# Patient Record
Sex: MALE | Race: WHITE
[De-identification: names, ages, dates, MRNs, and addresses within clinical notes are randomized per-mention and may not be internally consistent; named-entity substitution may affect disease eponyms.]

---

## 2019-07-26 ENCOUNTER — HOSPITAL ENCOUNTER (INPATIENT)
Dept: HOSPITAL 53 - M ED | Age: 84
LOS: 11 days | Discharge: HOSPICE | DRG: 330 | End: 2019-08-06
Admitting: INTERNAL MEDICINE
Payer: MEDICARE

## 2019-07-26 VITALS — DIASTOLIC BLOOD PRESSURE: 55 MMHG | SYSTOLIC BLOOD PRESSURE: 111 MMHG

## 2019-07-26 VITALS — DIASTOLIC BLOOD PRESSURE: 40 MMHG | SYSTOLIC BLOOD PRESSURE: 90 MMHG

## 2019-07-26 VITALS — HEIGHT: 69 IN | BODY MASS INDEX: 30.24 KG/M2 | WEIGHT: 204.15 LBS

## 2019-07-26 DIAGNOSIS — N40.0: ICD-10-CM

## 2019-07-26 DIAGNOSIS — Z79.82: ICD-10-CM

## 2019-07-26 DIAGNOSIS — I10: ICD-10-CM

## 2019-07-26 DIAGNOSIS — E78.5: ICD-10-CM

## 2019-07-26 DIAGNOSIS — E66.9: ICD-10-CM

## 2019-07-26 DIAGNOSIS — Z86.010: ICD-10-CM

## 2019-07-26 DIAGNOSIS — E87.6: ICD-10-CM

## 2019-07-26 DIAGNOSIS — D50.9: ICD-10-CM

## 2019-07-26 DIAGNOSIS — K59.00: ICD-10-CM

## 2019-07-26 DIAGNOSIS — C25.2: ICD-10-CM

## 2019-07-26 DIAGNOSIS — C78.6: Primary | ICD-10-CM

## 2019-07-26 DIAGNOSIS — Z79.899: ICD-10-CM

## 2019-07-26 LAB
ALBUMIN SERPL BCG-MCNC: 2.9 GM/DL (ref 3.2–5.2)
ALT SERPL W P-5'-P-CCNC: 25 U/L (ref 12–78)
BASOPHILS # BLD AUTO: 0 10^3/UL (ref 0–0.2)
BASOPHILS NFR BLD AUTO: 0.3 % (ref 0–1)
BILIRUB CONJ SERPL-MCNC: 0.3 MG/DL (ref 0–0.2)
BILIRUB SERPL-MCNC: 1.1 MG/DL (ref 0.2–1)
BUN SERPL-MCNC: 21 MG/DL (ref 7–18)
CALCIUM SERPL-MCNC: 9.3 MG/DL (ref 8.8–10.2)
CHLORIDE SERPL-SCNC: 104 MEQ/L (ref 98–107)
CO2 SERPL-SCNC: 28 MEQ/L (ref 21–32)
CREAT SERPL-MCNC: 1.01 MG/DL (ref 0.7–1.3)
EOSINOPHIL # BLD AUTO: 0 10^3/UL (ref 0–0.5)
EOSINOPHIL NFR BLD AUTO: 0.1 % (ref 0–3)
GFR SERPL CREATININE-BSD FRML MDRD: > 60 ML/MIN/{1.73_M2} (ref 35–?)
GLUCOSE SERPL-MCNC: 143 MG/DL (ref 70–100)
HCT VFR BLD AUTO: 40.1 % (ref 42–52)
HGB BLD-MCNC: 13.3 G/DL (ref 13.5–17.5)
LIPASE SERPL-CCNC: 61 U/L (ref 73–393)
LYMPHOCYTES # BLD AUTO: 0.8 10^3/UL (ref 1.5–4.5)
LYMPHOCYTES NFR BLD AUTO: 5.1 % (ref 24–44)
MCH RBC QN AUTO: 32.8 PG (ref 27–33)
MCHC RBC AUTO-ENTMCNC: 33.2 G/DL (ref 32–36.5)
MCV RBC AUTO: 98.8 FL (ref 80–96)
MONOCYTES # BLD AUTO: 0.6 10^3/UL (ref 0–0.8)
MONOCYTES NFR BLD AUTO: 3.8 % (ref 0–5)
NEUTROPHILS # BLD AUTO: 13.9 10^3/UL (ref 1.8–7.7)
NEUTROPHILS NFR BLD AUTO: 90 % (ref 36–66)
PLATELET # BLD AUTO: 255 10^3/UL (ref 150–450)
POTASSIUM SERPL-SCNC: 3.8 MEQ/L (ref 3.5–5.1)
PROT SERPL-MCNC: 7.1 GM/DL (ref 6.4–8.2)
RBC # BLD AUTO: 4.06 10^6/UL (ref 4.3–6.1)
SODIUM SERPL-SCNC: 141 MEQ/L (ref 136–145)
WBC # BLD AUTO: 15.4 10^3/UL (ref 4–10)

## 2019-07-26 RX ADMIN — ONDANSETRON ONE MG: 2 INJECTION INTRAMUSCULAR; INTRAVENOUS at 10:38

## 2019-07-26 RX ADMIN — HYDRALAZINE HYDROCHLORIDE SCH MG: 20 INJECTION INTRAMUSCULAR; INTRAVENOUS at 21:00

## 2019-07-26 RX ADMIN — DIATRIZOATE MEGLUMINE AND DIATRIZOATE SODIUM SCH ML: 600; 100 SOLUTION ORAL; RECTAL at 10:37

## 2019-07-26 RX ADMIN — ONDANSETRON ONE MG: 2 INJECTION INTRAMUSCULAR; INTRAVENOUS at 11:48

## 2019-07-26 RX ADMIN — DIATRIZOATE MEGLUMINE AND DIATRIZOATE SODIUM SCH ML: 600; 100 SOLUTION ORAL; RECTAL at 11:02

## 2019-07-26 RX ADMIN — ENOXAPARIN SODIUM SCH MG: 40 INJECTION SUBCUTANEOUS at 17:30

## 2019-07-26 RX ADMIN — DEXTROSE AND SODIUM CHLORIDE SCH MLS/HR: 5; 450 INJECTION, SOLUTION INTRAVENOUS at 15:57

## 2019-07-26 NOTE — MEDONC
NEW PATIENT CONSULTATION:

 

DATE OF SERVICE: 07/26/2019

 

REASON FOR CONSULTATION: Pancreatic mass.

 

This is a very pleasant 85-year-old gentleman who had presented to the emergency

room this morning due to a chief complaint of abdominal distension, persistent

nausea, vomiting and abdominal pain.  This had been going on for at least 24-48

hours.  When the patient arrived to the emergency room he was markedly distended

and an NG tube was placed for drainage.  The patient's abdomen was not acute on

palpation.  However, he had gone for imaging studies of the abdomen to rule out

acute abdominal obstruction.  The CT scan of the abdomen had shown multiple

calcified granulomas in the liver, prior cholecystectomy, prominence of the

common bile duct and multiple calcified granulomas in the spleen which were

normal in size.  There was a suspicious mass in the tail of the pancreas which

appeared to be partially cystic and solid measuring 3 x 9 x 3 cm.  There was no

evidence of any hydronephrosis.  There is an abdominal aortic aneurysm measuring

3.1 cm in maximum dimension.  There were also multiple subcentimeter lymph nodes

seen in the periaortic region and an ill-defined soft tissue in the omentum

compatible with neoplastic metastatic disease.  There was ill-defined

infiltration seen in the more inferior omentum and mesentery to mild extent.

There was a focal area of moderate mesenteric stranding and likely neoplastic

infiltration in the upper pelvis just right to the midline and the small bowel

loops appear to be involved and appear to be partially obstructed with moderate

proximal small bowel dilatation.  There was no evidence of any colonic thickening

or free air.  There is mild diffuse abdominal and pelvic ascites.  The patient

currently is comfortable in the ICU.  He is surrounded by his family and states

that he feels better now that he has the NG tube in.

 

PAST MEDICAL HISTORY:

The patient's past medical history includes benign prostatic hypertrophy (BPH),

hypertension, hyperlipidemia.  The patient's left lower leg is in a permanent

cast secondary to nonhealing of his lower leg, possible osteomyelitis. The

patient has a history of hypertension, gout. Colonoscopy in 2013 where the

patient had shown some nonbleeding internal hemorrhoids, diverticulosis, one

medium polyp in the mid transverse colon, which was biopsied and was consistent

with a tubular adenoma.

 

MEDICATIONS: Include:

- allopurinol 300 mg p.o. daily

- aspirin

- dipyridamole 1 capsule p.o. b.i.d.

- atorvastatin calcium 10 mg p.o. q.p.m.

- cyanocobalamin

- vitamin B12 500 mg p.o. daily

- finasteride 5 mg p.o. q.p.m.

- losartan - hydrochlorothiazide 50/12.5 mg one p.o. daily

- MVI 1 capsule p.o. daily

- omega fish oils 1000 mg p.o. daily

- omeprazole 20 mg p.o. daily

- Silodosin 8 mg p.o. q.p.m.

 

SOCIAL HISTORY

The patient is retired from the phone company. He lives with his wife. His

children are supportive.

 

FAMILY HISTORY: Otherwise noncontributory.

 

REVIEW OF SYSTEMS

As noted in HPI.

 

PHYSICAL EXAMINATION:

VITAL SIGNS:  The patient has a temperature of 98.3, pulse of 95, respiratory

rate is 20, BP is 111/57, pulse oximetry is 93, height is 175, weight is 181.9,

BSA is 2.01, BMI is 26.7.

HEENT: Normocephalic, atraumatic.  PERRL.  EOMI.  Sclerae is white, it is

nonicteric.  Oropharynx clear.  NG tube is present in the nares.

Neck: Supple.

Chest:  Decreased breath sounds at the bases.

Cardiovascular:  S1 and S2 appreciated with no murmurs.

Abdomen: Large, globoid, but distended, very tympanic throughout the abdominal

area.  Bowel sounds are absent.  There is no sign of any ascites.

Extremities: His left lower extremity is currently casted in his polyurethane

boot and there is no edema on the right.

 

LABORATORIES:

Shows a WBC count of 15.4, hemoglobin of 13.3, hematocrit of 40.1, RDW of 13,

platelets of 255, neutrophils of 90, lymphocytes are 5.1.

 

Chemistries show a lipase of 61, albumin of 2.9, alkaline phosphatase of 157, AST

25, ALT of 25, BUN 21, creatinine 1.01, sodium of 141, potassium 3.8.

 

ASSESSMENT:

Pancreatic tail mass with current mesentery thickening suggestive of small bowel

obstruction secondary to malignancy.

 

PLAN:

Plan is to get a biopsy of the mass.  The patient has had a history of a prior

tubulous bilious adenoma.  The pancreatic mass could be a metastatic site or a

primary site.  I advised the family that it is unlikely that we will have a

diagnosis this weekend and that we will see the availability of interventional

radiology.  His NG tube will need to remain for several days likely and then

further discussions about treatment etc will be made after tissue diagnosis is

confirmed.

 

 

 

Electronically Signed by

Chinyere Daley MD 07/29/2019 12:32 P

 

 

 

 

 

 

 

 

DD:  Chinyere Daley MD 07/26/2019 04:54 P

DT:  np 07/26/2019 05:50 P

 

CC:

## 2019-07-26 NOTE — HPEPDOC
[facility] Medical History & Physical


Date of Admission


Jul 26, 2019


Date of Service:  Jul 26, 2019


Attending Physician:  GRAHAM ESQUIVEL MD





History and Physical


CHIEF COMPLAINT: Abdominal pain 





HISTORY OF PRESENT ILLNESS: 


Mr. Clark is an 85 year old male resented with complaints of diffuse, 

nonradiating, eating, 9 out of 10 in severity, abdominal pain. Associated 

symptoms included constipation (his last BM was on Tuesday), along with nausea, 

vomiting and abdominal distention. He denied obstipation. He has lost weight but

denies fevers and chills.





PAST MEDICAL/ SURGICAL HISTORY:


1. Chronic hypertension.


2. Right lower extremity surgery.


3 BPH.


4 Dyslipidemia





SOCIAL HISTORY:


Quit smoking in the 60s.


Takes alcohol occasionally.





FAMILY HISTORY:


Denies family history of heart problems or lung problems.





ALLERGIES: Please see below.





REVIEW OF SYSTEMS:


12 point review of systems negative except as listed in HPI





HOME MEDICATIONS: Please see below. 





PHYSICAL EXAMINATION:


Temperature 97.8, heart rate 92, respiratory rate 18, blood pressure 111/55, 

pulse oximetry 93% on room air


GENERAL APPEARANCE:. Well-nourished, well-developed, has flat affect. 


HEENT:. Normocephalic, atraumatic. Mucous members moist and pink. NG tube in 

place to low wall suction. Stomach contents or bilious


CARDIOVASCULAR: Rate and rhythm. No murmurs, rubs or gallops.


LUNGS:. Sensation on room air.


ABDOMEN: Abdominal sounds are hypoactive. The abdomen is slightly distended, 

firm but not tender on palpation.


MUSCULOSKELETAL: Range of motion intact 4 extremities.


EXTREMITIES:, 2+ bilateral lower extremity edema.


NEUROLOGICAL: CN II to 12 grossly intact. Speech not dysarthric


PSYCHIATRIC:. Alert and oriented to person, place and time, able to understand 

and follow all commands. Patient has a flat affect.





LABORATORY DATA: See below.





IMAGING: 


CT of the abdomen showed a mass in the tail of the pancreas that was 3.9 x 3 cm.

There was metastatic disease noted to affect the mesentery and the small bowels 

causing obstruction.





MICROBIOLOGY: Please see below. 





ASSESSMENT: Mr. Clark is an 85-year-old gentleman with a past medical history of 

BPH, hypertension and dyslipidemia will be admitted for management of small bow

el obstruction secondary to metastatic pancreatic cancer.


.


PLAN:


1., Small bowel obstruction secondary to Metastatic pancreatic cancer.


Plan: Admit to medical floor, continue nothing by mouth and NG to suction, 

continue with IV fluids and Zofran, consult oncology and palliative care, 

morphine when necessary for pain control.





2. Chronic hypertension.


Plan: Monitor blood pressure, and hold meds well. Nothing by mouth, hydralazine 

PRN





3. BPH.


Plan hold meds





DVT prophylaxis with Lovenox.





Disposition pending clinical course





Vital Signs





Vital Signs








  Date Time  Temp Pulse Resp B/P (MAP) Pulse Ox O2 Delivery O2 Flow Rate FiO2


 


7/26/19 17:22 97.8 92 18 111/55 (73) 91   


 


7/26/19 14:15      Room Air  











Laboratory Data


Labs 24H


Laboratory Tests 2


7/26/19 09:47: 


Immature Granulocyte % (Auto) 0.7, White Blood Count 15.4H, Red Blood Count 

4.06L, Hemoglobin 13.3L, Hematocrit 40.1L, Mean Corpuscular Volume 98.8H, Mean 

Corpuscular Hemoglobin 32.8, Mean Corpuscular Hemoglobin Concent 33.2, Red Cell 

Distribution Width 13.0, Platelet Count 255, Neutrophils (%) (Auto) 90.0H, 

Lymphocytes (%) (Auto) 5.1L, Monocytes (%) (Auto) 3.8, Eosinophils (%) (Auto) 

0.1, Basophils (%) (Auto) 0.3, Neutrophils # (Auto) 13.9H, Lymphocytes # (Auto) 

0.8L, Monocytes # (Auto) 0.6, Eosinophils # (Auto) 0.0, Basophils # (Auto) 0.0, 

Nucleated Red Blood Cells % (auto) 0.0, Anion Gap 9, Glomerular Filtration Rate 

> 60.0, Calcium Level 9.3, Aspartate Amino Transf (AST/SGOT) 25, Alanine 

Aminotransferase (ALT/SGPT) 25, Alkaline Phosphatase 157H, Total Bilirubin 1.1H,

Direct Bilirubin 0.3H, Total Protein 7.1, Albumin 2.9L, Albumin/Globulin Ratio 

0.69L, Lipase 61L


7/26/19 11:49: 


Urine Color CARSON, Urine Appearance CLOUDYH, Urine pH 6.0, Urine Specific 

Gravity 1.021, Urine Protein NEGATIVE, Urine Glucose (UA) NEGATIVE, Urine 

Ketones NEGATIVE, Urine Blood NEGATIVE, Urine Nitrite NEGATIVE, Urine Bilirubin 

NEGATIVE, Urine Urobilinogen 2.0H, Urine Leukocyte Esterase NEGATIVE, Urine WBC 

(Auto) 1, Urine RBC (Auto) 2, Urine Hyaline Casts (Auto) 1, Urine Bacteria 

(Auto) NEGATIVE, Urine Squamous Epithelial Cells 0, Urine Amorphous Sediment 

SMALLH, Urine Mucus (Auto) SMALL, Urine Sperm (Auto) 


7/26/19 14:20: POC Lactate (Misc Panel) 2.48*H


7/26/19 18:33: Lactic Acid Level 1.7


CBC/BMP


Laboratory Tests


7/26/19 09:47








Red Blood Count 4.06 L, Mean Corpuscular Volume 98.8 H, Mean Corpuscular 

Hemoglobin 32.8, Mean Corpuscular Hemoglobin Concent 33.2, Red Cell Distribution

Width 13.0, Neutrophils (%) (Auto) 90.0 H, Lymphocytes (%) (Auto) 5.1 L, 

Monocytes (%) (Auto) 3.8, Eosinophils (%) (Auto) 0.1, Basophils (%) (Auto) 0.3, 

Neutrophils # (Auto) 13.9 H, Lymphocytes # (Auto) 0.8 L, Monocytes # (Auto) 0.6,

Eosinophils # (Auto) 0.0, Basophils # (Auto) 0.0





Home Medications


Scheduled


Allopurinol (Zyloprim) 300 Mg Tablet, 300 MG PO DAILY


Aspirin/Dipyridamole (Aggrenox 25 mg-200 mg Capsule) 1 Each Cpmp.12hr, 1 CAP PO 

BID


Atorvastatin Calcium (Atorvastatin Calcium) 10 Mg Tablet, 10 MG PO QPM


Main Cit/Mag/D3/Zn//Armand/Bor (Citracal-Vit D + Magnesium Tab) 1 Each Tablet, 1

TAB PO DAILY


Cyanocobalamin (Vitamin B-12) (Vitamin B-12) 500 Mcg Tablet, 500 MCG PO DAILY


Finasteride (Finasteride) 5 Mg Tablet, 5 MG PO QPM


Losartan/Hydrochlorothiazide (Losartan-Hctz 50-12.5 mg Tab) 1 Each Tablet, 1 TAB

PO DAILY


Multivitamin (Multivitamins) 1 Each Capsule, 1 CAP PO DAILY


Omega-3 Fatty Acids/Fish Oil (Fish Oil 1,000 mg Capsule) 1 Each Capsule, 1,000 

MG PO DAILY


Omeprazole Magnesium (Prilosec Otc) 20 Mg Tablet.dr, 20 MG PO DAILY


Silodosin (Silodosin) 8 Mg Capsule, 8 MG PO QPM





Allergies


Coded Allergies:  


     No Known Allergies (Verified , 7/7/03)





A-FIB/CHADSVASC


A-FIB History


Current/History of A-Fib/PAF?:  No


Current PO Anticoag Therapy:  No











GRAHAM ESQUIVEL MD                Jul 26, 2019 20:58

## 2019-07-26 NOTE — REP
CT ABDOMEN AND PELVIS WITH ORAL AND IV CONTRAST:

 

TECHNIQUE:  Axial contrast enhanced images from the lung bases to the pubic

symphysis using 100 mL Isovue 370 intravenous contrast material with multiplanar

reformations.

 

Visualized lung bases demonstrate patchy infiltrates or atelectasis inferiorly

bilaterally.

 

Multiple calcified granulomas are seen in the liver.  No parenchymal mass is

seen.  The patient has had prior cholecystectomy.  There is expected prominence

of the common bile duct and central intrahepatic ducts.  Multiple calcified

granulomas are seen in the spleen, which is normal in size.  The adrenals are

normal.  There is a suspicious mass in the tail of the pancreas, which appears

partially cystic and solid.  It measures approximately 3.9 x 3.0 cm.  A few small

cysts are seen in each kidney.  There is no hydronephrosis bilaterally.  Moderate

atherosclerotic calcification is seen of the abdominal aorta with minimal

aneurysmal dilatation 3.1 cm in maximum AP dimension.  Multiple subcentimeter

lymph nodes are seen in the periaortic region.  Ill-defined soft tissue is seen

in the omentum compatible with neoplastic metastatic disease. Ill-defined

infiltration is seen of the more inferior omentum and mesentery to a mild extent.

There is a focal area of moderate mesenteric stranding and likely neoplastic

infiltration in the upper pelvis just to the right of midline.  A small bowel

loops appears to be involved and appears to be partially obstructed with moderate

proximal small bowel dilatation.   No definite colonic thickening is seen.  There

is sigmoid diverticulosis.  There is mild scattered free fluid throughout the

abdomen and pelvis.  There is no free air.  Urinary bladder is not well-distended

and not well evaluated.  There is a small right inguinal hernia containing mild

fluid.  No bony lesions are seen.

 

IMPRESSION:

 

Patchy bibasilar infiltrates/atelectasis in the visualized lung bases.

Suspicious mass in the tail of the pancreas 3.9 x 3.0 cm.  Abnormal soft tissue,

which is ill-defined, diffusely in the superior omentum compatible with

neoplastic infiltration and metastatic disease. There is also scattered

infiltration of the mesentery, which is more focal in the upper pelvis to the

right of midline.  This affects a small bowel loop at that location and appears

to cause small bowel obstruction.  The more proximal small bowel demonstrates

moderate diffuse dilatation.  There is mild diffuse abdominal and pelvic ascites.

 

 

 

Electronically Signed by

Marshall Callahan MD 07/30/2019 05:32 P

## 2019-07-26 NOTE — HPEPDOC
General


Date of Admission


Jul 26, 2019 at 15:04


Chief Complaint


The patient is a 85-year-old male admitted with a reason for visit of Bowel Obs

truction, Pancreatic Mass.





Home Medications


Scheduled


Allopurinol (Zyloprim) 300 Mg Tablet, 300 MG PO DAILY, (Reported)


Aspirin/Dipyridamole (Aggrenox 25 mg-200 mg Capsule) 1 Each Cpmp.12hr, 1 CAP PO 

BID, (Reported)


Atorvastatin Calcium (Atorvastatin Calcium) 10 Mg Tablet, 10 MG PO QPM, 

(Reported)


Main Cit/Mag/D3/Zn//Armand/Bor (Citracal-Vit D + Magnesium Tab) 1 Each Tablet, 1

TAB PO DAILY, (Reported)


Cyanocobalamin (Vitamin B-12) (Vitamin B-12) 500 Mcg Tablet, 500 MCG PO DAILY, 

(Reported)


Finasteride (Finasteride) 5 Mg Tablet, 5 MG PO QPM, (Reported)


Losartan/Hydrochlorothiazide (Losartan-Hctz 50-12.5 mg Tab) 1 Each Tablet, 1 TAB

PO DAILY, (Reported)


Multivitamin (Multivitamins) 1 Each Capsule, 1 CAP PO DAILY, (Reported)


Omega-3 Fatty Acids/Fish Oil (Fish Oil 1,000 mg Capsule) 1 Each Capsule, 1,000 

MG PO DAILY, (Reported)


Omeprazole Magnesium (Prilosec Otc) 20 Mg Tablet.dr, 20 MG PO DAILY, (Reported)


Silodosin (Silodosin) 8 Mg Capsule, 8 MG PO QPM, (Reported)





Allergies


Coded Allergies:  


     No Known Allergies (Verified , 7/7/03)





Vital Signs





Vital Signs








  Date Time  Temp Pulse Resp B/P (MAP) Pulse Ox O2 Delivery O2 Flow Rate FiO2


 


7/26/19 17:22 97.8 92 18 111/55 (73) 91   


 


7/26/19 14:15      Room Air  











Laboratory Data


Labs 24H


Laboratory Tests 2


7/26/19 09:47: 


Immature Granulocyte % (Auto) 0.7, White Blood Count 15.4H, Red Blood Count 

4.06L, Hemoglobin 13.3L, Hematocrit 40.1L, Mean Corpuscular Volume 98.8H, Mean 

Corpuscular Hemoglobin 32.8, Mean Corpuscular Hemoglobin Concent 33.2, Red Cell 

Distribution Width 13.0, Platelet Count 255, Neutrophils (%) (Auto) 90.0H, 

Lymphocytes (%) (Auto) 5.1L, Monocytes (%) (Auto) 3.8, Eosinophils (%) (Auto) 

0.1, Basophils (%) (Auto) 0.3, Neutrophils # (Auto) 13.9H, Lymphocytes # (Auto) 

0.8L, Monocytes # (Auto) 0.6, Eosinophils # (Auto) 0.0, Basophils # (Auto) 0.0, 

Nucleated Red Blood Cells % (auto) 0.0, Anion Gap 9, Glomerular Filtration Rate 

> 60.0, Calcium Level 9.3, Aspartate Amino Transf (AST/SGOT) 25, Alanine Am

inotransferase (ALT/SGPT) 25, Alkaline Phosphatase 157H, Total Bilirubin 1.1H, 

Direct Bilirubin 0.3H, Total Protein 7.1, Albumin 2.9L, Albumin/Globulin Ratio 

0.69L, Lipase 61L


7/26/19 11:49: 


Urine Color CARSON, Urine Appearance CLOUDYH, Urine pH 6.0, Urine Specific 

Gravity 1.021, Urine Protein NEGATIVE, Urine Glucose (UA) NEGATIVE, Urine 

Ketones NEGATIVE, Urine Blood NEGATIVE, Urine Nitrite NEGATIVE, Urine Bilirubin 

NEGATIVE, Urine Urobilinogen 2.0H, Urine Leukocyte Esterase NEGATIVE, Urine WBC 

(Auto) 1, Urine RBC (Auto) 2, Urine Hyaline Casts (Auto) 1, Urine Bacteria (Aut

o) NEGATIVE, Urine Squamous Epithelial Cells 0, Urine Amorphous Sediment SMALLH,

Urine Mucus (Auto) SMALL, Urine Sperm (Auto) 


7/26/19 14:20: POC Lactate (Misc Panel) 2.48*H


CBC/BMP


Laboratory Tests


7/26/19 09:47








Red Blood Count 4.06 L, Mean Corpuscular Volume 98.8 H, Mean Corpuscular 

Hemoglobin 32.8, Mean Corpuscular Hemoglobin Concent 33.2, Red Cell Distribution

Width 13.0, Neutrophils (%) (Auto) 90.0 H, Lymphocytes (%) (Auto) 5.1 L, 

Monocytes (%) (Auto) 3.8, Eosinophils (%) (Auto) 0.1, Basophils (%) (Auto) 0.3, 

Neutrophils # (Auto) 13.9 H, Lymphocytes # (Auto) 0.8 L, Monocytes # (Auto) 0.6,

Eosinophils # (Auto) 0.0, Basophils # (Auto) 0.0











GRAHAM ESQUIVEL MD                Jul 26, 2019 17:28

## 2019-07-27 VITALS — DIASTOLIC BLOOD PRESSURE: 59 MMHG | SYSTOLIC BLOOD PRESSURE: 117 MMHG

## 2019-07-27 VITALS — SYSTOLIC BLOOD PRESSURE: 132 MMHG | DIASTOLIC BLOOD PRESSURE: 66 MMHG

## 2019-07-27 VITALS — SYSTOLIC BLOOD PRESSURE: 131 MMHG | DIASTOLIC BLOOD PRESSURE: 67 MMHG

## 2019-07-27 VITALS — DIASTOLIC BLOOD PRESSURE: 55 MMHG | SYSTOLIC BLOOD PRESSURE: 108 MMHG

## 2019-07-27 VITALS — SYSTOLIC BLOOD PRESSURE: 104 MMHG | DIASTOLIC BLOOD PRESSURE: 58 MMHG

## 2019-07-27 VITALS — DIASTOLIC BLOOD PRESSURE: 58 MMHG | SYSTOLIC BLOOD PRESSURE: 101 MMHG

## 2019-07-27 VITALS — DIASTOLIC BLOOD PRESSURE: 57 MMHG | SYSTOLIC BLOOD PRESSURE: 106 MMHG

## 2019-07-27 LAB
ALBUMIN SERPL BCG-MCNC: 2.6 GM/DL (ref 3.2–5.2)
ALT SERPL W P-5'-P-CCNC: 18 U/L (ref 12–78)
BASOPHILS # BLD AUTO: 0 10^3/UL (ref 0–0.2)
BASOPHILS NFR BLD AUTO: 0.1 % (ref 0–1)
BILIRUB SERPL-MCNC: 0.9 MG/DL (ref 0.2–1)
BUN SERPL-MCNC: 30 MG/DL (ref 7–18)
BUN SERPL-MCNC: 30 MG/DL (ref 7–18)
CALCIUM SERPL-MCNC: 8.6 MG/DL (ref 8.8–10.2)
CALCIUM SERPL-MCNC: 9 MG/DL (ref 8.8–10.2)
CHLORIDE SERPL-SCNC: 102 MEQ/L (ref 98–107)
CHLORIDE SERPL-SCNC: 103 MEQ/L (ref 98–107)
CO2 SERPL-SCNC: 33 MEQ/L (ref 21–32)
CO2 SERPL-SCNC: 33 MEQ/L (ref 21–32)
CREAT SERPL-MCNC: 0.93 MG/DL (ref 0.7–1.3)
CREAT SERPL-MCNC: 1.01 MG/DL (ref 0.7–1.3)
EOSINOPHIL # BLD AUTO: 0 10^3/UL (ref 0–0.5)
EOSINOPHIL NFR BLD AUTO: 0.2 % (ref 0–3)
GFR SERPL CREATININE-BSD FRML MDRD: > 60 ML/MIN/{1.73_M2} (ref 35–?)
GFR SERPL CREATININE-BSD FRML MDRD: > 60 ML/MIN/{1.73_M2} (ref 35–?)
GLUCOSE SERPL-MCNC: 125 MG/DL (ref 70–100)
GLUCOSE SERPL-MCNC: 127 MG/DL (ref 70–100)
HCT VFR BLD AUTO: 32.4 % (ref 42–52)
HGB BLD-MCNC: 11 G/DL (ref 13.5–17.5)
LYMPHOCYTES # BLD AUTO: 1.4 10^3/UL (ref 1.5–4.5)
LYMPHOCYTES NFR BLD AUTO: 8 % (ref 24–44)
MCH RBC QN AUTO: 32.8 PG (ref 27–33)
MCHC RBC AUTO-ENTMCNC: 34 G/DL (ref 32–36.5)
MCV RBC AUTO: 96.7 FL (ref 80–96)
MONOCYTES # BLD AUTO: 1 10^3/UL (ref 0–0.8)
MONOCYTES NFR BLD AUTO: 5.7 % (ref 0–5)
NEUTROPHILS # BLD AUTO: 14.6 10^3/UL (ref 1.8–7.7)
NEUTROPHILS NFR BLD AUTO: 85.3 % (ref 36–66)
PLATELET # BLD AUTO: 247 10^3/UL (ref 150–450)
POTASSIUM SERPL-SCNC: 3.3 MEQ/L (ref 3.5–5.1)
POTASSIUM SERPL-SCNC: 3.4 MEQ/L (ref 3.5–5.1)
PROT SERPL-MCNC: 6.8 GM/DL (ref 6.4–8.2)
RBC # BLD AUTO: 3.35 10^6/UL (ref 4.3–6.1)
SODIUM SERPL-SCNC: 142 MEQ/L (ref 136–145)
SODIUM SERPL-SCNC: 142 MEQ/L (ref 136–145)
WBC # BLD AUTO: 17.2 10^3/UL (ref 4–10)

## 2019-07-27 RX ADMIN — DEXTROSE AND SODIUM CHLORIDE SCH MLS/HR: 5; 450 INJECTION, SOLUTION INTRAVENOUS at 10:30

## 2019-07-27 RX ADMIN — HYDRALAZINE HYDROCHLORIDE SCH MG: 20 INJECTION INTRAMUSCULAR; INTRAVENOUS at 13:00

## 2019-07-27 RX ADMIN — POTASSIUM CHLORIDE SCH MLS/HR: 7.46 INJECTION, SOLUTION INTRAVENOUS at 14:43

## 2019-07-27 RX ADMIN — HYDRALAZINE HYDROCHLORIDE SCH MG: 20 INJECTION INTRAMUSCULAR; INTRAVENOUS at 19:56

## 2019-07-27 RX ADMIN — ENOXAPARIN SODIUM SCH MG: 40 INJECTION SUBCUTANEOUS at 10:30

## 2019-07-27 RX ADMIN — HYDRALAZINE HYDROCHLORIDE SCH MG: 20 INJECTION INTRAMUSCULAR; INTRAVENOUS at 05:05

## 2019-07-27 RX ADMIN — FINASTERIDE SCH MG: 5 TABLET, FILM COATED ORAL at 20:06

## 2019-07-27 RX ADMIN — POTASSIUM CHLORIDE SCH MLS/HR: 7.46 INJECTION, SOLUTION INTRAVENOUS at 17:00

## 2019-07-27 NOTE — IPNPDOC
Subjective


Date Seen


The patient was seen on 7/27/19.


Time of service 9:50 AM





Subjective


Chief Complaint/HPI


Abdominal pain


Events since last encounter


The patient denies having any abdominal pain, or any other acute complaints this

morning.


Per discussion with his RN  the patient continues to have large amount of 

bilious output through the NG





Objective


Physical Examination


Other physical findings


PHYSICAL EXAMINATION:


GENERAL APPEARANCE:. Well-nourished, well-developed, has flat affect. 


HEENT:. Normocephalic, atraumatic. Mucous members moist and pink. NG tube in p

lace to low wall suction. Stomach contents or bilious


CARDIOVASCULAR: Rate and rhythm. No murmurs, rubs or gallops.


LUNGS:. Sensation on room air.


ABDOMEN: Abdominal sounds are hypoactive the abdomen is less distended than 

yesterday and  not  tender on palpation 


MUSCULOSKELETAL: Range of motion intact 4 extremities.


EXTREMITIES:, 2+ bilateral lower extremity edema.


NEUROLOGICAL: CN II to 12 grossly intact. Speech not dysarthric


PSYCHIATRIC:. Alert and oriented to person, place and time, able to understand 

and follow all commands. Patient has a flat affect.





Assessment /Plan


Assessment


Mr. Clark is an 85-year-old gentleman with a past medical history of BPH, hype

rtension and dyslipidemia will be admitted for management of small bowel 

obstruction secondary to metastatic pancreatic cancer.





PLAN:


1, Small bowel obstruction secondary to Metastatic cancer.


Per Dr. Daley's  consult it's unclear whether the pancreas as the primary or 

sediment. Past desists


Plan appreciate oncology and surgery's recommendations, continue the NG to 

suction, IV fluids, Zofran and morphine 





2. Chronic hypertension.


Plan: Monitor blood pressure, and hold meds well. Nothing by mouth, hydralazine 

PRN





3. BPH.


Plan: hold meds





4. Hypokalemia.


Likely secondary to GI loss


Plan repeat potassium.





5. Obesity.


BMI 30.3





DVT prophylaxis with Lovenox.





Disposition pending clinical course





Plan/VTE


VTE Prophylaxis Ordered?:  Yes (lovenox)





VS, I&O, 24H, Fishbone


Vital Signs/I&O





Vital Signs








  Date Time  Temp Pulse Resp B/P (MAP) Pulse Ox O2 Delivery O2 Flow Rate FiO2


 


7/27/19 13:00    117/59    


 


7/27/19 12:00 97.0 74 18  96   


 


7/26/19 14:15      Room Air  














I&O- Last 24 Hours up to 6 AM 


 


 7/27/19





 06:00


 


Intake Total 500 ml


 


Output Total 675 ml


 


Balance -175 ml











Laboratory Data


24H LABS


Laboratory Tests 2


7/26/19 18:33: Lactic Acid Level 1.7


7/27/19 04:59: 


Anion Gap 7L, Glomerular Filtration Rate > 60.0, Blood Urea Nitrogen 30H, 

Creatinine 1.01, Sodium Level 142, Potassium Level 3.4L, Chloride Level 102, 

Carbon Dioxide Level 33H, Calcium Level 9.0, Aspartate Amino Transf (AST/SGOT) 

16, Alanine Aminotransferase (ALT/SGPT) 18, Alkaline Phosphatase 124H, Total 

Bilirubin 0.9, Total Protein 6.8, Albumin 2.6L, Albumin/Globulin Ratio 0.62L


7/27/19 07:47: Lactic Acid Level 1.8


7/27/19 08:27: 


Anion Gap 6L, Glomerular Filtration Rate > 60.0, Blood Urea Nitrogen 30H, 

Creatinine 0.93, Sodium Level 142, Potassium Level 3.3L, Chloride Level 103, 

Carbon Dioxide Level 33H, Calcium Level 8.6L, Immature Granulocyte % (Auto) 0.7,

White Blood Count 17.2H, Red Blood Count 3.35L, Hemoglobin 11.0#L, Hematocrit 

32.4L, Mean Corpuscular Volume 96.7H, Mean Corpuscular Hemoglobin 32.8, Mean 

Corpuscular Hemoglobin Concent 34.0, Red Cell Distribution Width 12.9, Platelet 

Count 247, Neutrophils (%) (Auto) 85.3H, Lymphocytes (%) (Auto) 8.0L, Monocytes 

(%) (Auto) 5.7H, Eosinophils (%) (Auto) 0.2, Basophils (%) (Auto) 0.1, 

Neutrophils # (Auto) 14.6H, Lymphocytes # (Auto) 1.4L, Monocytes # (Auto) 1.0H, 

Eosinophils # (Auto) 0.0, Basophils # (Auto) 0.0, Nucleated Red Blood Cells % 

(auto) 0.0


CBC/BMP


Laboratory Tests


7/27/19 04:59








Calcium Level 9.0, Aspartate Amino Transf (AST/SGOT) 16, Alanine 

Aminotransferase (ALT/SGPT) 18, Alkaline Phosphatase 124 H, Total Bilirubin 0.9,

Total Protein 6.8, Albumin 2.6 L





7/27/19 08:27








Calcium Level 8.6 L, Red Blood Count 3.35 L, Mean Corpuscular Volume 96.7 H, 

Mean Corpuscular Hemoglobin 32.8, Mean Corpuscular Hemoglobin Concent 34.0, Red 

Cell Distribution Width 12.9, Neutrophils (%) (Auto) 85.3 H, Lymphocytes (%) 

(Auto) 8.0 L, Monocytes (%) (Auto) 5.7 H, Eosinophils (%) (Auto) 0.2, Basophils 

(%) (Auto) 0.1, Neutrophils # (Auto) 14.6 H, Lymphocytes # (Auto) 1.4 L, 

Monocytes # (Auto) 1.0 H, Eosinophils # (Auto) 0.0, Basophils # (Auto) 0.0











GRAHAM ESQUIVEL MD                Jul 27, 2019 16:04

## 2019-07-28 VITALS — DIASTOLIC BLOOD PRESSURE: 64 MMHG | SYSTOLIC BLOOD PRESSURE: 138 MMHG

## 2019-07-28 VITALS — SYSTOLIC BLOOD PRESSURE: 140 MMHG | DIASTOLIC BLOOD PRESSURE: 73 MMHG

## 2019-07-28 VITALS — DIASTOLIC BLOOD PRESSURE: 64 MMHG | SYSTOLIC BLOOD PRESSURE: 136 MMHG

## 2019-07-28 VITALS — DIASTOLIC BLOOD PRESSURE: 67 MMHG | SYSTOLIC BLOOD PRESSURE: 129 MMHG

## 2019-07-28 VITALS — SYSTOLIC BLOOD PRESSURE: 131 MMHG | DIASTOLIC BLOOD PRESSURE: 64 MMHG

## 2019-07-28 LAB
BASOPHILS # BLD AUTO: 0 10^3/UL (ref 0–0.2)
BASOPHILS NFR BLD AUTO: 0.1 % (ref 0–1)
BUN SERPL-MCNC: 24 MG/DL (ref 7–18)
CALCIUM SERPL-MCNC: 8.4 MG/DL (ref 8.8–10.2)
CHLORIDE SERPL-SCNC: 103 MEQ/L (ref 98–107)
CO2 SERPL-SCNC: 31 MEQ/L (ref 21–32)
CREAT SERPL-MCNC: 0.88 MG/DL (ref 0.7–1.3)
EOSINOPHIL # BLD AUTO: 0.1 10^3/UL (ref 0–0.5)
EOSINOPHIL NFR BLD AUTO: 0.6 % (ref 0–3)
GFR SERPL CREATININE-BSD FRML MDRD: > 60 ML/MIN/{1.73_M2} (ref 35–?)
GLUCOSE SERPL-MCNC: 120 MG/DL (ref 70–100)
HCT VFR BLD AUTO: 35.1 % (ref 42–52)
HGB BLD-MCNC: 11.7 G/DL (ref 13.5–17.5)
LYMPHOCYTES # BLD AUTO: 0.9 10^3/UL (ref 1.5–4.5)
LYMPHOCYTES NFR BLD AUTO: 6.6 % (ref 24–44)
MAGNESIUM SERPL-MCNC: 2.3 MG/DL (ref 1.8–2.4)
MCH RBC QN AUTO: 32.6 PG (ref 27–33)
MCHC RBC AUTO-ENTMCNC: 33.3 G/DL (ref 32–36.5)
MCV RBC AUTO: 97.8 FL (ref 80–96)
MONOCYTES # BLD AUTO: 0.7 10^3/UL (ref 0–0.8)
MONOCYTES NFR BLD AUTO: 5.3 % (ref 0–5)
NEUTROPHILS # BLD AUTO: 11.9 10^3/UL (ref 1.8–7.7)
NEUTROPHILS NFR BLD AUTO: 86.7 % (ref 36–66)
PLATELET # BLD AUTO: 263 10^3/UL (ref 150–450)
POTASSIUM SERPL-SCNC: 3.3 MEQ/L (ref 3.5–5.1)
RBC # BLD AUTO: 3.59 10^6/UL (ref 4.3–6.1)
SODIUM SERPL-SCNC: 140 MEQ/L (ref 136–145)
WBC # BLD AUTO: 13.7 10^3/UL (ref 4–10)

## 2019-07-28 RX ADMIN — HYDRALAZINE HYDROCHLORIDE SCH MG: 20 INJECTION INTRAMUSCULAR; INTRAVENOUS at 13:00

## 2019-07-28 RX ADMIN — HYDRALAZINE HYDROCHLORIDE SCH MG: 20 INJECTION INTRAMUSCULAR; INTRAVENOUS at 04:38

## 2019-07-28 RX ADMIN — FINASTERIDE SCH MG: 5 TABLET, FILM COATED ORAL at 21:43

## 2019-07-28 RX ADMIN — HYDRALAZINE HYDROCHLORIDE SCH MG: 20 INJECTION INTRAMUSCULAR; INTRAVENOUS at 21:00

## 2019-07-28 RX ADMIN — DEXTROSE AND SODIUM CHLORIDE SCH MLS/HR: 5; 450 INJECTION, SOLUTION INTRAVENOUS at 09:28

## 2019-07-28 RX ADMIN — ENOXAPARIN SODIUM SCH MG: 40 INJECTION SUBCUTANEOUS at 09:31

## 2019-07-28 NOTE — IPNPDOC
Subjective


Date Seen


The patient was seen on 7/28/19.


Time of service 8:20 AM





Subjective


Chief Complaint/HPI


Abdominal pain.


Events since last encounter


The patient reports feeling much better today, he denies having abdominal pain 

or any acute complaints. His daughter-in-law is at the bedside.


Per discussion with nursing staff. The patient is passing gas and had a bowel 

movement, the output through the NG has decreased this morning but during the 

night shift he had an output of about 900 milliliters.





Objective


Physical Examination


Other physical findings


PHYSICAL EXAMINATION:


VITALS: See below


GENERAL APPEARANCE: Well-nourished, well-developed, appears to be in a better 

mood today


HEENT: Normocephalic, atraumatic. Mucous members moist and pink. NG tube in p

lace to low wall suction. Stomach contents or bilious


CARDIOVASCULAR: Rate and rhythm. No murmurs, rubs or gallops.


LUNGS: Sensation on room air.


ABDOMEN: Bowel sounds are present, but more prominent on the left than on the 

right, the abdomen is no longer distended and is soft and nontender on palpation




MUSCULOSKELETAL: Range of motion intact 4 extremities. He has an ankle brace on

the lower extremity


EXTREMITIES:, 2+ bilateral lower extremity edema.


NEUROLOGICAL: CN II to 12 grossly intact. Speech not dysarthric


PSYCHIATRIC: Alert and oriented to person, place and time, able to understand 

and follow all commands.





Assessment /Plan


Assessment


Mr. Clark is an 85-year-old gentleman with a past medical history of BPH, 

hypertension and dyslipidemia will be admitted for management of small bowel 

obstruction secondary to metastatic cancer (primary source to be determined).





PLAN:


1, Small bowel obstruction secondary to Metastatic cancer.


Primary source to be determined


Plan: appreciate oncology and surgery's recommendations, continue NG to suction 

, IV fluids, Zofran and morphine, IR consult for biopsy, possibly tomorrow





2. Macrocytic anemia.


Hemoglobin has dropped more than 2 points overnight.


Plan: Follow up CBC, reticulocyte count, iron studies, B12, folate and stool 

occult





3 Chronic hypertension.


Plan: Monitor blood pressure, and hold meds well. Nothing by mouth, IV 

hydralazine PRN





4  BPH.


Plan: hold oral meds





5. Hypokalemia.


2/2 GI loss and poor PO intake


Plan: Replete K & Continue to monitor potassium and magnesium





6. Obesity.


BMI 30.3





DVT prophylaxis with Lovenox.





Disposition pending clinical course





Plan/VTE


VTE Prophylaxis Ordered?:  Yes (lovenox)





VS, I&O, 24H, Fishbone


Vital Signs/I&O





Vital Signs








  Date Time  Temp Pulse Resp B/P (MAP) Pulse Ox O2 Delivery O2 Flow Rate FiO2


 


7/28/19 08:00 98.3 76 17 140/73 (95) 94   


 


7/26/19 14:15      Room Air  














I&O- Last 24 Hours up to 6 AM 


 


 7/28/19





 06:00


 


Intake Total 550 ml


 


Output Total 1300 ml


 


Balance -750 ml











Laboratory Data


24H LABS


Laboratory Tests 2


7/27/19 17:10: 











GRAHMA ESQUIVEL MD                Jul 28, 2019 09:57

## 2019-07-28 NOTE — IPNPDOC
Text Note


Date of Service


The patient was seen on 7/28/19.





NOTE


No acute events overnight. He denies any nausea, emesis, fevers, abd pains, or 

distention. He is passing flatus, and had a BM overnight. Nurses report no NGT 

output over the night shift. 





VSSAF





NAD





abd - soft, slightly distended, NT





labs - see below





A) 84y/o male with partial SBO secondary to likely metastatic pancreatic CA that

appears to be resolving





P) clamp NGT


clq diet


ambulate


dc NG after dinner is still tolerating it clamped. 





Buzz Davidson DO





VS,Fishbone, I+O


VS, Fishbone, I+O





Vital Signs








  Date Time  Temp Pulse Resp B/P (MAP) Pulse Ox O2 Delivery O2 Flow Rate FiO2


 


7/28/19 08:00 98.3 76 17 140/73 (95) 94   


 


7/26/19 14:15      Room Air  














I&O- Last 24 Hours up to 6 AM 


 


 7/28/19





 06:00


 


Intake Total 550 ml


 


Output Total 1300 ml


 


Balance -750 ml

















RAO DAVIDSON DO            Jul 28, 2019 09:59

## 2019-07-29 VITALS — DIASTOLIC BLOOD PRESSURE: 62 MMHG | SYSTOLIC BLOOD PRESSURE: 128 MMHG

## 2019-07-29 VITALS — DIASTOLIC BLOOD PRESSURE: 58 MMHG | SYSTOLIC BLOOD PRESSURE: 124 MMHG

## 2019-07-29 VITALS — DIASTOLIC BLOOD PRESSURE: 69 MMHG | SYSTOLIC BLOOD PRESSURE: 131 MMHG

## 2019-07-29 VITALS — SYSTOLIC BLOOD PRESSURE: 137 MMHG | DIASTOLIC BLOOD PRESSURE: 70 MMHG

## 2019-07-29 VITALS — SYSTOLIC BLOOD PRESSURE: 128 MMHG | DIASTOLIC BLOOD PRESSURE: 61 MMHG

## 2019-07-29 VITALS — DIASTOLIC BLOOD PRESSURE: 56 MMHG | SYSTOLIC BLOOD PRESSURE: 115 MMHG

## 2019-07-29 LAB
BASOPHILS # BLD AUTO: 0 10^3/UL (ref 0–0.2)
BASOPHILS NFR BLD AUTO: 0.1 % (ref 0–1)
BUN SERPL-MCNC: 18 MG/DL (ref 7–18)
CALCIUM SERPL-MCNC: 8.5 MG/DL (ref 8.8–10.2)
CHLORIDE SERPL-SCNC: 100 MEQ/L (ref 98–107)
CO2 SERPL-SCNC: 33 MEQ/L (ref 21–32)
CREAT SERPL-MCNC: 0.82 MG/DL (ref 0.7–1.3)
EOSINOPHIL # BLD AUTO: 0.1 10^3/UL (ref 0–0.5)
EOSINOPHIL NFR BLD AUTO: 0.7 % (ref 0–3)
FERRITIN SERPL-MCNC: 157 NG/ML (ref 26–388)
GFR SERPL CREATININE-BSD FRML MDRD: > 60 ML/MIN/{1.73_M2} (ref 35–?)
GLUCOSE SERPL-MCNC: 120 MG/DL (ref 70–100)
HCT VFR BLD AUTO: 34.5 % (ref 42–52)
HGB BLD-MCNC: 11.4 G/DL (ref 13.5–17.5)
IRON SATN MFR SERPL: 10 % (ref 19.7–50)
IRON SERPL-MCNC: 27 UG/DL (ref 65–175)
LYMPHOCYTES # BLD AUTO: 1.1 10^3/UL (ref 1.5–4.5)
LYMPHOCYTES NFR BLD AUTO: 8 % (ref 24–44)
MAGNESIUM SERPL-MCNC: 2.3 MG/DL (ref 1.8–2.4)
MCH RBC QN AUTO: 32.2 PG (ref 27–33)
MCHC RBC AUTO-ENTMCNC: 33 G/DL (ref 32–36.5)
MCV RBC AUTO: 97.5 FL (ref 80–96)
MONOCYTES # BLD AUTO: 0.8 10^3/UL (ref 0–0.8)
MONOCYTES NFR BLD AUTO: 5.8 % (ref 0–5)
NEUTROPHILS # BLD AUTO: 11.4 10^3/UL (ref 1.8–7.7)
NEUTROPHILS NFR BLD AUTO: 84.7 % (ref 36–66)
PLATELET # BLD AUTO: 254 10^3/UL (ref 150–450)
POTASSIUM SERPL-SCNC: 2.9 MEQ/L (ref 3.5–5.1)
RBC # BLD AUTO: 3.54 10^6/UL (ref 4.3–6.1)
SODIUM SERPL-SCNC: 140 MEQ/L (ref 136–145)
TIBC SERPL-MCNC: 270 UG/DL (ref 250–450)
WBC # BLD AUTO: 13.5 10^3/UL (ref 4–10)

## 2019-07-29 RX ADMIN — HYDRALAZINE HYDROCHLORIDE SCH MG: 20 INJECTION INTRAMUSCULAR; INTRAVENOUS at 21:00

## 2019-07-29 RX ADMIN — POTASSIUM CHLORIDE SCH MLS/HR: 7.46 INJECTION, SOLUTION INTRAVENOUS at 10:57

## 2019-07-29 RX ADMIN — HYDRALAZINE HYDROCHLORIDE SCH MG: 20 INJECTION INTRAMUSCULAR; INTRAVENOUS at 05:00

## 2019-07-29 RX ADMIN — DEXTROSE AND SODIUM CHLORIDE SCH MLS/HR: 5; 450 INJECTION, SOLUTION INTRAVENOUS at 07:40

## 2019-07-29 RX ADMIN — ENOXAPARIN SODIUM SCH MG: 40 INJECTION SUBCUTANEOUS at 08:10

## 2019-07-29 RX ADMIN — POTASSIUM CHLORIDE SCH MLS/HR: 7.46 INJECTION, SOLUTION INTRAVENOUS at 14:18

## 2019-07-29 RX ADMIN — SODIUM CHLORIDE, PRESERVATIVE FREE SCH ML: 5 INJECTION INTRAVENOUS at 21:32

## 2019-07-29 RX ADMIN — POTASSIUM CHLORIDE SCH MLS/HR: 7.46 INJECTION, SOLUTION INTRAVENOUS at 15:34

## 2019-07-29 RX ADMIN — HYDRALAZINE HYDROCHLORIDE SCH MG: 20 INJECTION INTRAMUSCULAR; INTRAVENOUS at 12:45

## 2019-07-29 RX ADMIN — POTASSIUM CHLORIDE SCH MLS/HR: 7.46 INJECTION, SOLUTION INTRAVENOUS at 12:30

## 2019-07-29 RX ADMIN — FINASTERIDE SCH MG: 5 TABLET, FILM COATED ORAL at 21:28

## 2019-07-29 RX ADMIN — SODIUM CHLORIDE, PRESERVATIVE FREE SCH ML: 5 INJECTION INTRAVENOUS at 14:00

## 2019-07-29 NOTE — IPNPDOC
Text Note


Date of Service


The patient was seen on 7/29/19.





NOTE


No acute events overnight. He denies any nausea, emesis, fevers, abd pains, or 

distention. He is passing flatus. He did not tolerate the NGT clamped yesterday,

and had to be placed back to suction. 





VSSAF





NAD





abd - soft, slightly distended, NT





labs - see below





A) 84y/o male with partial SBO secondary to likely metastatic pancreatic CA 





P) NGT to LIS


IR for possible bx today


ambulate


if no improvement by the am, then I will plan for OR tomorrow. 





Buzz Davidson DO





VS,Fishbone, I+O


VS, Fishbone, I+O


Laboratory Tests


7/29/19 05:33








Red Blood Count 3.54 L, Mean Corpuscular Volume 97.5 H, Mean Corpuscular 

Hemoglobin 32.2, Mean Corpuscular Hemoglobin Concent 33.0, Red Cell Distribution

Width 12.7, Neutrophils (%) (Auto) 84.7 H, Lymphocytes (%) (Auto) 8.0 L, 

Monocytes (%) (Auto) 5.8 H, Eosinophils (%) (Auto) 0.7, Basophils (%) (Auto) 

0.1, Neutrophils # (Auto) 11.4 H, Lymphocytes # (Auto) 1.1 L, Monocytes # (Auto)

0.8, Eosinophils # (Auto) 0.1, Basophils # (Auto) 0.0, Calcium Level 8.5 L








Vital Signs








  Date Time  Temp Pulse Resp B/P (MAP) Pulse Ox O2 Delivery O2 Flow Rate FiO2


 


7/29/19 08:00 97.8 77 17 128/62 (84) 91   


 


7/26/19 14:15      Room Air  














I&O- Last 24 Hours up to 6 AM 


 


 7/29/19





 06:00


 


Intake Total 632.5 ml


 


Output Total 2930 ml


 


Balance -2297.5 ml

















RAO DAVIDSON DO            Jul 29, 2019 11:07

## 2019-07-29 NOTE — IPNPDOC
Subjective


Date Seen


The patient was seen on 7/29/19.


Time of service 10:20 AM





Subjective


Chief Complaint/HPI


Abdominal pain


Events since last encounter


The patient reports that his abdominal pain has resolved, and has no other acute

complaints





Objective


Physical Examination


Other physical findings


PHYSICAL EXAMINATION:


VITALS: See below


GENERAL APPEARANCE: Well-nourished, well-developed, 


HEENT: Normocephalic, atraumatic. Mucous members moist and pink. NG tube in gino

ce to gravity. Stomach contents or bilious


CARDIOVASCULAR: Rate and rhythm. No murmurs, rubs or gallops.


LUNGS: Sensation on room air.


ABDOMEN: Bowel sounds are present, but more prominent on the left the abdomen is

soft and nontender on palpation 


MUSCULOSKELETAL: Range of motion intact 4 extremities. He has an ankle brace on

the lower extremity


EXTREMITIES:, 2+ bilateral lower extremity edema.


NEUROLOGICAL: CN II to 12 grossly intact. Speech not dysarthric


PSYCHIATRIC: Alert and oriented to person, place and time, able to understand 

and follow all commands





Assessment /Plan


Assessment


Mr. Clark is an 85-year-old gentleman with a past medical history of BPH, hyp

ertension and dyslipidemia will be admitted for management of small bowel 

obstruction secondary to metastatic cancer (primary source to be determined).





PLAN:


1, Small bowel obstruction secondary to Metastatic cancer.


Primary source of cancer be determined


Plan: appreciate oncology and surgery's recommendations, continue NG to suction 

, IV fluids, Zofran and morphine, surgery planned for tomorrow, IR has been 

consulted for for a biopsy,





2. Iron deficiency anemia.


Hemoglobin has dropped more than 2 points overnight.


Plan: Follow up CBC and stool occult, will keep hemoglobin above 7





3 Hypokalemia.


2/2 GI loss and poor PO intake


Plan: Replete K & Continue to monitor potassium and magnesium





4.Chronic hypertension.


Plan: Monitor blood pressure, and hold meds well. Nothing by mouth, IV hydral

azine PRN





5.BPH.


Plan: hold oral meds. 





6. Obesity.


BMI 30.3





DVT. Lovenox on hold





Disposition pending clinical course





Plan/VTE


VTE Prophylaxis Ordered?:  Yes (lovenox)





VS, I&O, 24H, Fishbone


Vital Signs/I&O





Vital Signs








  Date Time  Temp Pulse Resp B/P (MAP) Pulse Ox O2 Delivery O2 Flow Rate FiO2


 


7/29/19 16:00 98.2 86 18 124/58 (80) 92   


 


7/26/19 14:15      Room Air  














I&O- Last 24 Hours up to 6 AM 


 


 7/29/19





 06:00


 


Intake Total 632.5 ml


 


Output Total 2930 ml


 


Balance -2297.5 ml











Laboratory Data


24H LABS


Laboratory Tests 2


7/29/19 05:33: 


Immature Granulocyte % (Auto) 0.7, White Blood Count 13.5H, Red Blood Count 

3.54L, Hemoglobin 11.4L, Hematocrit 34.5L, Mean Corpuscular Volume 97.5H, Mean 

Corpuscular Hemoglobin 32.2, Mean Corpuscular Hemoglobin Concent 33.0, Red Cell 

Distribution Width 12.7, Platelet Count 254, Neutrophils (%) (Auto) 84.7H, 

Lymphocytes (%) (Auto) 8.0L, Monocytes (%) (Auto) 5.8H, Eosinophils (%) (Auto) 

0.7, Basophils (%) (Auto) 0.1, Neutrophils # (Auto) 11.4H, Lymphocytes # (Auto) 

1.1L, Monocytes # (Auto) 0.8, Eosinophils # (Auto) 0.1, Basophils # (Auto) 0.0, 

Reticulocyte # (auto) 52.7, Nucleated Red Blood Cells % (auto) 0.0, Percent 

Reticulocyte Count 1.5, Reticulocyte Hemoglobin Equivalent 37.6H, Anion Gap 7L, 

Glomerular Filtration Rate > 60.0, Blood Urea Nitrogen 18, Creatinine 0.82, 

Sodium Level 140, Potassium Level 2.9*L, Chloride Level 100, Carbon Dioxide 

Level 33H, Calcium Level 8.5L, Magnesium Level 2.3, Iron Level 27L, Total Iron 

Binding Capacity 270, Transferrin % Saturation 10.0L, Ferritin 157


CBC/BMP


Laboratory Tests


7/29/19 05:33








Red Blood Count 3.54 L, Mean Corpuscular Volume 97.5 H, Mean Corpuscular 

Hemoglobin 32.2, Mean Corpuscular Hemoglobin Concent 33.0, Red Cell Distribution

Width 12.7, Neutrophils (%) (Auto) 84.7 H, Lymphocytes (%) (Auto) 8.0 L, 

Monocytes (%) (Auto) 5.8 H, Eosinophils (%) (Auto) 0.7, Basophils (%) (Auto) 

0.1, Neutrophils # (Auto) 11.4 H, Lymphocytes # (Auto) 1.1 L, Monocytes # (Auto)

0.8, Eosinophils # (Auto) 0.1, Basophils # (Auto) 0.0, Calcium Level 8.5 L











GRAHAM ESQUIVEL MD                Jul 29, 2019 18:02

## 2019-07-30 VITALS — SYSTOLIC BLOOD PRESSURE: 117 MMHG | DIASTOLIC BLOOD PRESSURE: 55 MMHG

## 2019-07-30 VITALS — DIASTOLIC BLOOD PRESSURE: 63 MMHG | SYSTOLIC BLOOD PRESSURE: 127 MMHG

## 2019-07-30 VITALS — DIASTOLIC BLOOD PRESSURE: 67 MMHG | SYSTOLIC BLOOD PRESSURE: 123 MMHG

## 2019-07-30 VITALS — SYSTOLIC BLOOD PRESSURE: 112 MMHG | DIASTOLIC BLOOD PRESSURE: 56 MMHG

## 2019-07-30 VITALS — SYSTOLIC BLOOD PRESSURE: 120 MMHG | DIASTOLIC BLOOD PRESSURE: 62 MMHG

## 2019-07-30 LAB
BASOPHILS # BLD AUTO: 0 10^3/UL (ref 0–0.2)
BASOPHILS NFR BLD AUTO: 0.1 % (ref 0–1)
BUN SERPL-MCNC: 16 MG/DL (ref 7–18)
CALCIUM SERPL-MCNC: 7.8 MG/DL (ref 8.8–10.2)
CANCER AG19-9 SERPL-ACNC: 648.5 U/ML (ref ?–35)
CHLORIDE SERPL-SCNC: 101 MEQ/L (ref 98–107)
CO2 SERPL-SCNC: 33 MEQ/L (ref 21–32)
CREAT SERPL-MCNC: 0.89 MG/DL (ref 0.7–1.3)
EOSINOPHIL # BLD AUTO: 0.1 10^3/UL (ref 0–0.5)
EOSINOPHIL NFR BLD AUTO: 0.5 % (ref 0–3)
FOLATE SERPL-MCNC: > 24 NG/ML (ref 5.4–?)
GFR SERPL CREATININE-BSD FRML MDRD: > 60 ML/MIN/{1.73_M2} (ref 35–?)
GLUCOSE SERPL-MCNC: 101 MG/DL (ref 70–100)
HCT VFR BLD AUTO: 34.3 % (ref 42–52)
HGB BLD-MCNC: 11.1 G/DL (ref 13.5–17.5)
LYMPHOCYTES # BLD AUTO: 0.9 10^3/UL (ref 1.5–4.5)
LYMPHOCYTES NFR BLD AUTO: 6.1 % (ref 24–44)
MAGNESIUM SERPL-MCNC: 2.4 MG/DL (ref 1.8–2.4)
MCH RBC QN AUTO: 31.8 PG (ref 27–33)
MCHC RBC AUTO-ENTMCNC: 32.4 G/DL (ref 32–36.5)
MCV RBC AUTO: 98.3 FL (ref 80–96)
MONOCYTES # BLD AUTO: 0.8 10^3/UL (ref 0–0.8)
MONOCYTES NFR BLD AUTO: 5.2 % (ref 0–5)
NEUTROPHILS # BLD AUTO: 13.1 10^3/UL (ref 1.8–7.7)
NEUTROPHILS NFR BLD AUTO: 87.5 % (ref 36–66)
PLATELET # BLD AUTO: 233 10^3/UL (ref 150–450)
POTASSIUM SERPL-SCNC: 3 MEQ/L (ref 3.5–5.1)
RBC # BLD AUTO: 3.49 10^6/UL (ref 4.3–6.1)
SODIUM SERPL-SCNC: 139 MEQ/L (ref 136–145)
VIT B12 SERPL-MCNC: 831 PG/ML (ref 247–911)
WBC # BLD AUTO: 15 10^3/UL (ref 4–10)

## 2019-07-30 PROCEDURE — 0DBV3ZX EXCISION OF MESENTERY, PERCUTANEOUS APPROACH, DIAGNOSTIC: ICD-10-PCS | Performed by: RADIOLOGY

## 2019-07-30 RX ADMIN — SODIUM CHLORIDE, PRESERVATIVE FREE SCH ML: 5 INJECTION INTRAVENOUS at 06:00

## 2019-07-30 RX ADMIN — MORPHINE SULFATE PRN MG: 4 INJECTION INTRAVENOUS at 21:12

## 2019-07-30 RX ADMIN — FINASTERIDE SCH MG: 5 TABLET, FILM COATED ORAL at 21:12

## 2019-07-30 RX ADMIN — POTASSIUM CHLORIDE SCH MLS/HR: 7.46 INJECTION, SOLUTION INTRAVENOUS at 11:30

## 2019-07-30 RX ADMIN — POTASSIUM CHLORIDE SCH MLS/HR: 7.46 INJECTION, SOLUTION INTRAVENOUS at 09:13

## 2019-07-30 RX ADMIN — SODIUM CHLORIDE, PRESERVATIVE FREE SCH ML: 5 INJECTION INTRAVENOUS at 14:57

## 2019-07-30 RX ADMIN — DEXTROSE AND SODIUM CHLORIDE SCH MLS/HR: 5; 450 INJECTION, SOLUTION INTRAVENOUS at 21:00

## 2019-07-30 RX ADMIN — SODIUM CHLORIDE, PRESERVATIVE FREE SCH ML: 5 INJECTION INTRAVENOUS at 21:40

## 2019-07-30 RX ADMIN — POTASSIUM CHLORIDE SCH MLS/HR: 7.46 INJECTION, SOLUTION INTRAVENOUS at 10:18

## 2019-07-30 RX ADMIN — HYDRALAZINE HYDROCHLORIDE SCH MG: 20 INJECTION INTRAMUSCULAR; INTRAVENOUS at 05:00

## 2019-07-30 RX ADMIN — DEXTROSE AND SODIUM CHLORIDE SCH MLS/HR: 5; 450 INJECTION, SOLUTION INTRAVENOUS at 09:12

## 2019-07-30 NOTE — IPNPDOC
Subjective


Date Seen


The patient was seen on 7/30/19.


Time of Service 10:40 AM





Subjective


Chief Complaint/HPI


Abdominal pain


Events since last encounter


The patient reports that his abdominal pain has not reoccurred, and he is 

passing gas. He has not had a bowel movement.


Per discussion with nursing staff, the NG was removed early on this morning.


Per case management rounds discussion the patient is scheduled for a biopsy by 

IR today and has been cleared by PT.





Objective


Physical Examination


Other physical findings


VITALS: See below


GENERAL APPEARANCE: Well-nourished, well-developed, 


HEENT: Normocephalic, atraumatic. Mucous members moist and pink. NG has been 

removed


CARDIOVASCULAR: Rate and rhythm. No murmurs, rubs or gallops.


LUNGS: Sensation on room air.


ABDOMEN: Bowel sounds are present, in all quadrants the abdomen is soft and 

nontender on palpation 


MUSCULOSKELETAL: Range of motion intact 4 extremities.. He has a clubfoot at 

the left


EXTREMITIES:, 2+ bilateral lower extremity edema.


NEUROLOGICAL: CN II to 12 grossly intact. Speech not dysarthric


PSYCHIATRIC: Alert and oriented to person, place and time, able to understand 

and follow all commands





Assessment /Plan


Assessment


Mr. Clark is an 85-year-old gentleman with a past medical history of BPH, 

hypertension and dyslipidemia will be admitted for management of small bowel 

obstruction secondary to metastatic cancer (primary source to be determined).





PLAN:


1, Small bowel obstruction secondary to Metastatic cancer.


Primary source of cancer be determined


NG has been removed


Plan: appreciate oncology and surgery's recommendations, taking a clear liquid 

diet, IV fluids, Zofran and morphine, biopsied by IR today, with possible plans 

for surgery pending results





2. Iron deficiency anemia.


Hemoglobin has dropped more than 2 points overnight.


Plan: Follow up CBC and stool occult, will keep hemoglobin above 7





3 Hypokalemia.


2/2 GI loss and poor PO intake


Plan: Replete K & Continue to monitor potassium and magnesium





4.Chronic hypertension.


Plan: Monitor blood pressure, and hold meds well. Nothing by mouth, IV 

hydralazine PRN





5.BPH.


Plan: hold oral meds. 





6. Obesity.


BMI 30.3





DVT. Lovenox on hold





Disposition transferred to general medical floor today with plans to go home 

soon pending clinical course





Plan/VTE


VTE Prophylaxis Ordered?:  Yes (lovenox)





VS, I&O, 24H, Fishbone


Vital Signs/I&O





Vital Signs








  Date Time  Temp Pulse Resp B/P (MAP) Pulse Ox O2 Delivery O2 Flow Rate FiO2


 


7/30/19 12:25 97.8 78 16  94   


 


7/30/19 05:00    123/67    


 


7/26/19 14:15      Room Air  














I&O- Last 24 Hours up to 6 AM 


 


 7/30/19





 06:00


 


Intake Total 520 ml


 


Output Total 480 ml


 


Balance 40 ml











Laboratory Data


24H LABS


Laboratory Tests 2


7/30/19 05:21: 


Immature Granulocyte % (Auto) 0.6, White Blood Count 15.0H, Red Blood Count 

3.49L, Hemoglobin 11.1L, Hematocrit 34.3L, Mean Corpuscular Volume 98.3H, Mean 

Corpuscular Hemoglobin 31.8, Mean Corpuscular Hemoglobin Concent 32.4, Red Cell 

Distribution Width 12.8, Platelet Count 233, Neutrophils (%) (Auto) 87.5H, 

Lymphocytes (%) (Auto) 6.1L, Monocytes (%) (Auto) 5.2H, Eosinophils (%) (Auto) 

0.5, Basophils (%) (Auto) 0.1, Neutrophils # (Auto) 13.1H, Lymphocytes # (Auto) 

0.9L, Monocytes # (Auto) 0.8, Eosinophils # (Auto) 0.1, Basophils # (Auto) 0.0, 

Nucleated Red Blood Cells % (auto) 0.0, Anion Gap 5L, Glomerular Filtration Rate

> 60.0, Blood Urea Nitrogen 16, Creatinine 0.89, Sodium Level 139, Potassium 

Level 3.0#L, Chloride Level 101, Carbon Dioxide Level 33H, Calcium Level 7.8L, 

Magnesium Level 2.4


CBC/BMP


Laboratory Tests


7/29/19 18:10








7/30/19 05:21








Red Blood Count 3.49 L, Mean Corpuscular Volume 98.3 H, Mean Corpuscular 

Hemoglobin 31.8, Mean Corpuscular Hemoglobin Concent 32.4, Red Cell Distribution

Width 12.8, Neutrophils (%) (Auto) 87.5 H, Lymphocytes (%) (Auto) 6.1 L, 

Monocytes (%) (Auto) 5.2 H, Eosinophils (%) (Auto) 0.5, Basophils (%) (Auto) 

0.1, Neutrophils # (Auto) 13.1 H, Lymphocytes # (Auto) 0.9 L, Monocytes # (Auto)

0.8, Eosinophils # (Auto) 0.1, Basophils # (Auto) 0.0, Calcium Level 7.8 L











GRAHAM ESQUIVEL MD                Jul 30, 2019 13:08

## 2019-07-31 VITALS — DIASTOLIC BLOOD PRESSURE: 59 MMHG | SYSTOLIC BLOOD PRESSURE: 116 MMHG

## 2019-07-31 VITALS — DIASTOLIC BLOOD PRESSURE: 65 MMHG | SYSTOLIC BLOOD PRESSURE: 137 MMHG

## 2019-07-31 VITALS — SYSTOLIC BLOOD PRESSURE: 158 MMHG | DIASTOLIC BLOOD PRESSURE: 77 MMHG

## 2019-07-31 VITALS — DIASTOLIC BLOOD PRESSURE: 65 MMHG | SYSTOLIC BLOOD PRESSURE: 128 MMHG

## 2019-07-31 LAB
BASOPHILS # BLD AUTO: 0 10^3/UL (ref 0–0.2)
BASOPHILS NFR BLD AUTO: 0.2 % (ref 0–1)
BUN SERPL-MCNC: 15 MG/DL (ref 7–18)
CALCIUM SERPL-MCNC: 7.4 MG/DL (ref 8.8–10.2)
CHLORIDE SERPL-SCNC: 102 MEQ/L (ref 98–107)
CO2 SERPL-SCNC: 31 MEQ/L (ref 21–32)
CREAT SERPL-MCNC: 0.78 MG/DL (ref 0.7–1.3)
EOSINOPHIL # BLD AUTO: 0.2 10^3/UL (ref 0–0.5)
EOSINOPHIL NFR BLD AUTO: 1 % (ref 0–3)
GFR SERPL CREATININE-BSD FRML MDRD: > 60 ML/MIN/{1.73_M2} (ref 35–?)
GLUCOSE SERPL-MCNC: 118 MG/DL (ref 70–100)
HCT VFR BLD AUTO: 32.1 % (ref 42–52)
HGB BLD-MCNC: 10.6 G/DL (ref 13.5–17.5)
LYMPHOCYTES # BLD AUTO: 1.3 10^3/UL (ref 1.5–4.5)
LYMPHOCYTES NFR BLD AUTO: 7.1 % (ref 24–44)
MAGNESIUM SERPL-MCNC: 2.4 MG/DL (ref 1.8–2.4)
MCH RBC QN AUTO: 32.2 PG (ref 27–33)
MCHC RBC AUTO-ENTMCNC: 33 G/DL (ref 32–36.5)
MCV RBC AUTO: 97.6 FL (ref 80–96)
MONOCYTES # BLD AUTO: 0.8 10^3/UL (ref 0–0.8)
MONOCYTES NFR BLD AUTO: 4.3 % (ref 0–5)
NEUTROPHILS # BLD AUTO: 16.3 10^3/UL (ref 1.8–7.7)
NEUTROPHILS NFR BLD AUTO: 86.8 % (ref 36–66)
PLATELET # BLD AUTO: 235 10^3/UL (ref 150–450)
POTASSIUM SERPL-SCNC: 3.1 MEQ/L (ref 3.5–5.1)
RBC # BLD AUTO: 3.29 10^6/UL (ref 4.3–6.1)
SODIUM SERPL-SCNC: 139 MEQ/L (ref 136–145)
WBC # BLD AUTO: 18.7 10^3/UL (ref 4–10)

## 2019-07-31 RX ADMIN — SODIUM CHLORIDE, PRESERVATIVE FREE SCH ML: 5 INJECTION INTRAVENOUS at 21:14

## 2019-07-31 RX ADMIN — SODIUM CHLORIDE, PRESERVATIVE FREE SCH ML: 5 INJECTION INTRAVENOUS at 06:15

## 2019-07-31 RX ADMIN — POTASSIUM CHLORIDE SCH MEQ: 750 TABLET, EXTENDED RELEASE ORAL at 20:07

## 2019-07-31 RX ADMIN — FINASTERIDE SCH MG: 5 TABLET, FILM COATED ORAL at 20:07

## 2019-07-31 RX ADMIN — SODIUM CHLORIDE, PRESERVATIVE FREE SCH ML: 5 INJECTION INTRAVENOUS at 13:01

## 2019-07-31 RX ADMIN — DEXTROSE AND SODIUM CHLORIDE SCH MLS/HR: 5; 450 INJECTION, SOLUTION INTRAVENOUS at 15:46

## 2019-07-31 RX ADMIN — POTASSIUM CHLORIDE SCH MLS/HR: 7.46 INJECTION, SOLUTION INTRAVENOUS at 10:16

## 2019-07-31 RX ADMIN — POTASSIUM CHLORIDE SCH MEQ: 750 TABLET, EXTENDED RELEASE ORAL at 15:32

## 2019-07-31 RX ADMIN — POTASSIUM CHLORIDE SCH MLS/HR: 7.46 INJECTION, SOLUTION INTRAVENOUS at 11:29

## 2019-07-31 RX ADMIN — POTASSIUM CHLORIDE SCH MEQ: 750 TABLET, EXTENDED RELEASE ORAL at 09:38

## 2019-07-31 NOTE — IPNPDOC
Subjective


Date Seen


The patient was seen on 7/31/19.


Time of service 6:20 PM





Subjective


Chief Complaint/HPI


The patient had episodes of emesis today and NG to suction was resumed . Her 

discussion with nursing staff. He vomited about 800 ml, and about 1.5 L has been

suction so far.


The patient denies having any abdominal pain or any acute complaints right now





Objective


Physical Examination


Other physical findings


VITALS: See below


GENERAL APPEARANCE: Well-nourished, well-developed, 


HEENT: Normocephalic, atraumatic. Mucous members moist and pink. NG to suction


CARDIOVASCULAR: Rate and rhythm. No murmurs, rubs or gallops.


LUNGS: Sensation on room air.


ABDOMEN: Bowel sounds are present, in all quadrants the abdomen is soft and 

nontender on palpation 


MUSCULOSKELETAL: Range of motion intact 4 extremities.. He has a clubfoot at 

the left


EXTREMITIES:, 2+ bilateral lower extremity edema.


NEUROLOGICAL: CN II to 12 grossly intact. Speech not dysarthric


PSYCHIATRIC: Alert and oriented to person, place and time, able to understand 

and follow all commands





Assessment /Plan


Assessment


Mr. Clark is an 85-year-old gentleman with a past medical history of BPH, 

hypertension and dyslipidemia will be admitted for management of small bowel 

obstruction secondary to metastatic pancreatic cancer.





PLAN:


1, Small bowel obstruction secondary to Metastatic  Pancretic Cancer.


Pathology results showed findings consistent with metastatic pancreatic cancer.


KUB shows findings consistent with high-grade small bowel obstruction


Plan: appreciate oncology and surgery's recommendations, NG to suction, nothing 

by mouth, IV fluids, Zofran and morphine / f/u w Oncology on treatment plan





2. Iron deficiency anemia.


Hemoglobin has dropped more than 2 points overnight.


Plan: Follow up CBC and stool occult, will keep hemoglobin above 7





3 Hypokalemia.


2/2 GI loss and poor PO intake


Plan: Replete K & Continue to monitor potassium and magnesium





4.Chronic hypertension.


Plan: Monitor blood pressure, and hold meds well. Nothing by mouth, IV 

metoprolol PRN





5.BPH.


Plan: hold oral meds. 





6. Obesity.


BMI 30.3





DVT. Lovenox on hold





Disposition pending clinical course





Plan/VTE


VTE Prophylaxis Ordered?:  Yes (lovenox)





VS, I&O, 24H, Fishbone


Vital Signs/I&O





Vital Signs








  Date Time  Temp Pulse Resp B/P (MAP) Pulse Ox O2 Delivery O2 Flow Rate FiO2


 


7/31/19 14:00 97.9 77 16 128/65 (86) 92   


 


7/26/19 14:15      Room Air  














I&O- Last 24 Hours up to 6 AM 


 


 7/31/19





 06:00


 


Intake Total 240 ml


 


Output Total 250 ml


 


Balance -10 ml











Laboratory Data


24H LABS


Laboratory Tests 2


7/31/19 06:20: 


Immature Granulocyte % (Auto) 0.6, White Blood Count 18.7H, Red Blood Count 

3.29L, Hemoglobin 10.6L, Hematocrit 32.1L, Mean Corpuscular Volume 97.6H, Mean 

Corpuscular Hemoglobin 32.2, Mean Corpuscular Hemoglobin Concent 33.0, Red Cell 

Distribution Width 12.9, Platelet Count 235, Neutrophils (%) (Auto) 86.8H, 

Lymphocytes (%) (Auto) 7.1L, Monocytes (%) (Auto) 4.3, Eosinophils (%) (Auto) 

1.0, Basophils (%) (Auto) 0.2, Neutrophils # (Auto) 16.3H, Lymphocytes # (Auto) 

1.3L, Monocytes # (Auto) 0.8, Eosinophils # (Auto) 0.2, Basophils # (Auto) 0.0, 

Nucleated Red Blood Cells % (auto) 0.0, Anion Gap 6L, Glomerular Filtration Rate

> 60.0, Blood Urea Nitrogen 15, Creatinine 0.78, Sodium Level 139, Potassium 

Level 3.1L, Chloride Level 102, Carbon Dioxide Level 31, Calcium Level 7.4L, 

Magnesium Level 2.4


7/31/19 12:10: Methicillin-Resist S.aureus DNA PCR NOT DETECTED


CBC/BMP


Laboratory Tests


7/31/19 06:20








Red Blood Count 3.29 L, Mean Corpuscular Volume 97.6 H, Mean Corpuscular 

Hemoglobin 32.2, Mean Corpuscular Hemoglobin Concent 33.0, Red Cell Distribution

Width 12.9, Neutrophils (%) (Auto) 86.8 H, Lymphocytes (%) (Auto) 7.1 L, 

Monocytes (%) (Auto) 4.3, Eosinophils (%) (Auto) 1.0, Basophils (%) (Auto) 0.2, 

Neutrophils # (Auto) 16.3 H, Lymphocytes # (Auto) 1.3 L, Monocytes # (Auto) 0.8,

Eosinophils # (Auto) 0.2, Basophils # (Auto) 0.0, Calcium Level 7.4 L











GRAHAM ESQUIVEL MD                Jul 31, 2019 19:01

## 2019-07-31 NOTE — CR
DATE OF CONSULTATION:  07/27/2019

 

REASON FOR CONSULTATION:  Small bowel obstruction.

 

HISTORY OF PRESENT ILLNESS:  The patient is an 85-year-old male who was admitted

due to diffuse abdominal pain associated with nausea, vomiting. This has been

going on for about a day or two and getting progressively worse.  In the

emergency room (ER), he was found to have signs of likely pancreatic mass with a

metastatic lesion into the right lower abdomen resulting in obstruction.  He was

admitted to the medicine service.  He already has a nasogastric (NG) tube in

place.  Oncology has also already discussed and evaluated him, and I was called

to see him due to the obstruction.  He has not had any other symptoms of bowel

obstruction in the past.  No prior surgeries to his abdomen.  Denies any fevers

or chills.  No more nausea or vomiting with the NG tube in place.  His abdomen is

already significantly softer and the pain is gone.  He is still not passing any

gas and no bowel movement since admission.

 

PAST MEDICAL HISTORY:

1.  Hypertension.

2.  BPH.

3.  Dyslipidemia.

 

PAST SURGICAL HISTORY:  Right lower extremity.

 

SOCIAL HISTORY: Denies drug, alcohol, tobacco abuse.

 

FAMILY HISTORY:  Noncontributory.

 

ALLERGIES:  NONE.

 

HOME MEDS:  Please see med rec.

 

REVIEW OF SYSTEMS:  Pertinent positives and negatives as stated in the HPI.

 

PHYSICAL EXAMINATION:

General:  Alert and oriented times three.  No acute distress.

Vital signs:  Temperature 98.5, pulse 80, respirations 18, blood pressure 108/55,

pulse oximetry 95% on room air.

HEENT:  Pupils equal, round and react to light and accommodation.

Heart:  S1, S2, regular rate and rhythm.

Lungs:  Clear to auscultation bilaterally.

Abdomen:  Soft, distended, nontender.

Extremities:  No clubbing, cyanosis, or edema.

 

LABS:  White count 15.4, hemoglobin 13.3, platelets 255.  Potassium is 3.8,

creatinine 1.01, total bilirubin 1.1, direct bilirubin 0.3.

 

IMAGING:  CT abdomen and pelvis shows a mass at the tail of pancreas about 3.9 x

3 cm.  Abnormal soft tissue in the superior omentum with neoplastic infiltrates

and metastatic disease.  There is also scattered infiltration of the mesentery

more so in the upper pelvis to the right of midline affecting small bowel loops

in this location appearing to cause a small bowel obstruction.

 

ASSESSMENT AND PLAN:  The patient is an 85-year-old male with signs and symptoms

suspicious for pancreatic cancer with metastatic disease to the small bowel

resulting in small bowel obstruction.  Recommendation at this time is to treat

medically with intravenous (IV) fluids, nothing by mouth, NG tube to low

intermittent suction, and antibiotics.  I discussed his disease with him and the

best case scenario is we obtain a biopsy and get him started on some sort of

palliative treatment to shrink this disease.  Surgical intervention would be

palliative at best and depending on the extent of the mass causing the

obstructive process he may only be able to have an ileostomy to bypass the area.

At this point, we will do our best to treat him medically and avoid any surgical

intervention because that would also postpone him starting any chemo.  He

understands all of this and I will continue to follow with you.

## 2019-07-31 NOTE — REP
ULTRASOUND-GUIDED MESENTERIC BIOPSY

 

The procedure was performed under the direct supervision of Dr. Tian.

 

The patient has a history of multiple neoplastic metastatic nodules seen in the

omentum and mesentery seen on a previous CT scan dated 07/26/2019.

 

The risks and benefits of the procedure were explained to the patient and

informed consent was obtained.

 

The mesenteric nodule in the right upper quadrant was localized for biopsy using

ultrasound guidance.  The skin was prepped and draped in a sterile fashion.  1%

lidocaine was used as a local anesthetic.  Using ultrasound guidance a 17/18

gauge coaxial needle biopsy system was inserted and advanced into the nodule.

Eight core biopsy samples were obtained and sent to lab.

 

The patient tolerated the procedure well and there were no immediate

complications.  After the appropriate amount of monitored convalescence the

patient was discharged from the department.

 

 

Reviewed by

BRYSON Kline 07/30/2019 04:38 P

Electronically Signed by

José Tian MD 07/31/2019 09:04 A

## 2019-07-31 NOTE — REP
Clinical:  Abdominal pain and distension.  Rule out small bowel obstruction.

 

Technique:  Two supine views of the abdomen and pelvis.

 

Findings:

Bowel gas pattern is consistent with high-grade small bowel obstruction and

correlation is required.  No obvious free air to suggest perforation.  Evidence

for prior cholecystectomy.  Skeletal structures demonstrate age-related

degenerative changes.

 

Impression:

Findings consistent with high-grade small bowel obstruction.

 

 

Electronically Signed by

See Echols MD 07/31/2019 11:45 A

## 2019-07-31 NOTE — IPNPDOC
Text Note


Date of Service


The patient was seen on 7/31/19.





NOTE


No acute events overnight. He denies any nausea, emesis, fevers, abd pains, or 

distention. He is passing flatus, and had a few soft BMs. Biopsy was successful 

yesterday. 





VSSAF





NAD





abd - soft, slightly distended, NT





labs - see below





A) 84y/o male with partial SBO secondary to likely metastatic pancreatic CA that

has resolved





P)


ambulate


reg low residue diet


d/c home


f/u with oncology ASAP to discuss pathology and treatment options


no need to follow up with surgery unless there is a problem.





Buzz Davidson DO





VS,Fishbone, I+O


VS, Fishbone, I+O


Laboratory Tests


7/31/19 06:20








Red Blood Count 3.29 L, Mean Corpuscular Volume 97.6 H, Mean Corpuscular 

Hemoglobin 32.2, Mean Corpuscular Hemoglobin Concent 33.0, Red Cell Distribution

Width 12.9, Neutrophils (%) (Auto) 86.8 H, Lymphocytes (%) (Auto) 7.1 L, 

Monocytes (%) (Auto) 4.3, Eosinophils (%) (Auto) 1.0, Basophils (%) (Auto) 0.2, 

Neutrophils # (Auto) 16.3 H, Lymphocytes # (Auto) 1.3 L, Monocytes # (Auto) 0.8,

Eosinophils # (Auto) 0.2, Basophils # (Auto) 0.0, Calcium Level 7.4 L








Vital Signs








  Date Time  Temp Pulse Resp B/P (MAP) Pulse Ox O2 Delivery O2 Flow Rate FiO2


 


7/31/19 06:00 98.2 73 16 116/59 (78) 93   


 


7/26/19 14:15      Room Air  














I&O- Last 24 Hours up to 6 AM 


 


 7/31/19





 06:00


 


Intake Total 240 ml


 


Output Total 250 ml


 


Balance -10 ml

















RAO DAVIDSON DO            Jul 31, 2019 08:34

## 2019-08-01 VITALS — DIASTOLIC BLOOD PRESSURE: 64 MMHG | SYSTOLIC BLOOD PRESSURE: 126 MMHG

## 2019-08-01 VITALS — DIASTOLIC BLOOD PRESSURE: 76 MMHG | SYSTOLIC BLOOD PRESSURE: 159 MMHG

## 2019-08-01 VITALS — SYSTOLIC BLOOD PRESSURE: 138 MMHG | DIASTOLIC BLOOD PRESSURE: 63 MMHG

## 2019-08-01 VITALS — SYSTOLIC BLOOD PRESSURE: 125 MMHG | DIASTOLIC BLOOD PRESSURE: 64 MMHG

## 2019-08-01 LAB
BASOPHILS # BLD AUTO: 0.1 10^3/UL (ref 0–0.2)
BASOPHILS NFR BLD AUTO: 0.3 % (ref 0–1)
BUN SERPL-MCNC: 12 MG/DL (ref 7–18)
CALCIUM SERPL-MCNC: 8 MG/DL (ref 8.8–10.2)
CHLORIDE SERPL-SCNC: 99 MEQ/L (ref 98–107)
CO2 SERPL-SCNC: 35 MEQ/L (ref 21–32)
CREAT SERPL-MCNC: 0.88 MG/DL (ref 0.7–1.3)
EOSINOPHIL # BLD AUTO: 0.2 10^3/UL (ref 0–0.5)
EOSINOPHIL NFR BLD AUTO: 1.2 % (ref 0–3)
GFR SERPL CREATININE-BSD FRML MDRD: > 60 ML/MIN/{1.73_M2} (ref 35–?)
GLUCOSE SERPL-MCNC: 119 MG/DL (ref 70–100)
HCT VFR BLD AUTO: 33.7 % (ref 42–52)
HGB BLD-MCNC: 11 G/DL (ref 13.5–17.5)
LYMPHOCYTES # BLD AUTO: 1.5 10^3/UL (ref 1.5–4.5)
LYMPHOCYTES NFR BLD AUTO: 8.2 % (ref 24–44)
MAGNESIUM SERPL-MCNC: 2.4 MG/DL (ref 1.8–2.4)
MCH RBC QN AUTO: 32.4 PG (ref 27–33)
MCHC RBC AUTO-ENTMCNC: 32.6 G/DL (ref 32–36.5)
MCV RBC AUTO: 99.1 FL (ref 80–96)
MONOCYTES # BLD AUTO: 0.7 10^3/UL (ref 0–0.8)
MONOCYTES NFR BLD AUTO: 4 % (ref 0–5)
NEUTROPHILS # BLD AUTO: 15.3 10^3/UL (ref 1.8–7.7)
NEUTROPHILS NFR BLD AUTO: 85.2 % (ref 36–66)
PLATELET # BLD AUTO: 237 10^3/UL (ref 150–450)
POTASSIUM SERPL-SCNC: 3.4 MEQ/L (ref 3.5–5.1)
RBC # BLD AUTO: 3.4 10^6/UL (ref 4.3–6.1)
SODIUM SERPL-SCNC: 139 MEQ/L (ref 136–145)
WBC # BLD AUTO: 17.9 10^3/UL (ref 4–10)

## 2019-08-01 PROCEDURE — 0DB80ZZ EXCISION OF SMALL INTESTINE, OPEN APPROACH: ICD-10-PCS | Performed by: SURGERY

## 2019-08-01 RX ADMIN — FINASTERIDE SCH MG: 5 TABLET, FILM COATED ORAL at 22:31

## 2019-08-01 RX ADMIN — MORPHINE SULFATE PRN MG: 4 INJECTION INTRAVENOUS at 01:01

## 2019-08-01 RX ADMIN — MORPHINE SULFATE PRN MG: 4 INJECTION INTRAVENOUS at 16:17

## 2019-08-01 RX ADMIN — DEXTROSE MONOHYDRATE SCH MLS/HR: 5 INJECTION INTRAVENOUS at 08:52

## 2019-08-01 RX ADMIN — SODIUM CHLORIDE, PRESERVATIVE FREE SCH ML: 5 INJECTION INTRAVENOUS at 22:27

## 2019-08-01 RX ADMIN — POTASSIUM CHLORIDE SCH MEQ: 750 TABLET, EXTENDED RELEASE ORAL at 15:36

## 2019-08-01 RX ADMIN — SODIUM CHLORIDE, PRESERVATIVE FREE SCH ML: 5 INJECTION INTRAVENOUS at 05:35

## 2019-08-01 RX ADMIN — POTASSIUM CHLORIDE SCH MEQ: 750 TABLET, EXTENDED RELEASE ORAL at 08:52

## 2019-08-01 RX ADMIN — DEXTROSE AND SODIUM CHLORIDE SCH MLS/HR: 5; 450 INJECTION, SOLUTION INTRAVENOUS at 22:31

## 2019-08-01 RX ADMIN — DEXTROSE AND SODIUM CHLORIDE SCH MLS/HR: 5; 450 INJECTION, SOLUTION INTRAVENOUS at 00:45

## 2019-08-01 RX ADMIN — SODIUM CHLORIDE, PRESERVATIVE FREE SCH ML: 5 INJECTION INTRAVENOUS at 12:01

## 2019-08-01 RX ADMIN — MORPHINE SULFATE PRN MG: 4 INJECTION INTRAVENOUS at 11:39

## 2019-08-01 RX ADMIN — DEXTROSE MONOHYDRATE SCH MLS/HR: 5 INJECTION INTRAVENOUS at 22:31

## 2019-08-01 RX ADMIN — POTASSIUM CHLORIDE SCH MEQ: 750 TABLET, EXTENDED RELEASE ORAL at 22:31

## 2019-08-01 RX ADMIN — DEXTROSE MONOHYDRATE SCH MLS/HR: 5 INJECTION INTRAVENOUS at 15:36

## 2019-08-01 RX ADMIN — KETOROLAC TROMETHAMINE PRN MG: 30 INJECTION, SOLUTION INTRAMUSCULAR at 22:43

## 2019-08-01 NOTE — IPNPDOC
Subjective


Date Seen


The patient was seen on 8/1/19.





Subjective


Chief Complaint/HPI


85m with hx of bph admitted with sbo and found to have pancreatic cancer





pt states he has been tolerating liquids but has been unable to tolerate food. 

He is aware of his diagnosis, however patient and family have several questions 

regarding prognosis and the treatment plan. I was informed the oncologist is on 

her way to speak with them





Full ROS was performed and negative except as documented above





Objective


Physical Examination


General Exam:  Positive: Alert, Cooperative, No Acute Distress


Eye Exam:  Positive: PERRLA, Conjunctiva & lids normal, EOMI


ENT Exam:  Positive: Atraumatic, Mucous membr. moist/pink, Pharynx Normal


Neck Exam:  Positive: Supple; 


   Negative: JVD, thyromegaly


Chest Exam:  Positive: Clear to auscultation, Normal air movement


Heart Exam:  Positive: Rate Normal, Regular Rhythm, Normal S1, Normal S2; 


   Negative: Murmurs, Rubs


Abdomen Exam:  Positive: Normal bowel sounds, Soft; 


   Negative: Tenderness, Hepatospenomegaly


Extremity Exam:  Positive: Normal pulses; 


   Negative: Clubbing, Cyanosis, Edema


Skin Exam:  Positive: Nl turgor and temperature; 


   Negative: Rash, Breakdown


Neuro Exam:  Positive: Normal Gait, Normal Speech, Cranial Nerves 3-12 NL, 

Reflexes 2+


Psych Exam:  Positive: Mental status NL, Mood NL, Oriented x 3





Assessment /Plan


Assessment


85m p/w high grade sbo and newly diagnosed metastatic pancreatic cancer 





Pancreatic cancer


mets to mesentery


oncology consulting


will follow up prognosis and treatment plan


goc deferred until after Dr Kumar's discussion with them





SBO


surgery following


pt with ng tube


clear liquid diet


possibility of surgical intervention raised


will depend on goc and prognosis





Plan/VTE


VTE Prophylaxis Ordered?:  Yes (lovenox)





VS, I&O, 24H, Fishbone


Vital Signs/I&O





Vital Signs








  Date Time  Temp Pulse Resp B/P (MAP) Pulse Ox O2 Delivery O2 Flow Rate FiO2


 


8/1/19 17:07   16  94   


 


8/1/19 14:00 98.3 83  159/76 (103)    


 


7/26/19 14:15      Room Air  














I&O- Last 24 Hours up to 6 AM 


 


 8/1/19





 06:00


 


Intake Total 720 ml


 


Output Total 2075 ml


 


Balance -1355 ml











Laboratory Data


24H LABS


Laboratory Tests 2


8/1/19 06:07: 


Immature Granulocyte % (Auto) 1.1, White Blood Count 17.9H, Red Blood Count 

3.40L, Hemoglobin 11.0L, Hematocrit 33.7L, Mean Corpuscular Volume 99.1H, Mean 

Corpuscular Hemoglobin 32.4, Mean Corpuscular Hemoglobin Concent 32.6, Red Cell 

Distribution Width 12.9, Platelet Count 237, Neutrophils (%) (Auto) 85.2H, 

Lymphocytes (%) (Auto) 8.2L, Monocytes (%) (Auto) 4.0, Eosinophils (%) (Auto) 

1.2, Basophils (%) (Auto) 0.3, Neutrophils # (Auto) 15.3H, Lymphocytes # (Auto) 

1.5, Monocytes # (Auto) 0.7, Eosinophils # (Auto) 0.2, Basophils # (Auto) 0.1, 

Nucleated Red Blood Cells % (auto) 0.0, Anion Gap 5L, Glomerular Filtration Rate

> 60.0, Blood Urea Nitrogen 12, Creatinine 0.88, Sodium Level 139, Potassium 

Level 3.4L, Chloride Level 99, Carbon Dioxide Level 35H, Calcium Level 8.0L, 

Magnesium Level 2.4


CBC/BMP


Laboratory Tests


8/1/19 06:07








Red Blood Count 3.40 L, Mean Corpuscular Volume 99.1 H, Mean Corpuscular 

Hemoglobin 32.4, Mean Corpuscular Hemoglobin Concent 32.6, Red Cell Distribution

Width 12.9, Neutrophils (%) (Auto) 85.2 H, Lymphocytes (%) (Auto) 8.2 L, 

Monocytes (%) (Auto) 4.0, Eosinophils (%) (Auto) 1.2, Basophils (%) (Auto) 0.3, 

Neutrophils # (Auto) 15.3 H, Lymphocytes # (Auto) 1.5, Monocytes # (Auto) 0.7, 

Eosinophils # (Auto) 0.2, Basophils # (Auto) 0.1, Calcium Level 8.0 L











FREEDOM DOTSON MD           Aug 1, 2019 19:04

## 2019-08-01 NOTE — RO
DATE OF PROCEDURE:  08/01/2019

 

PREOPERATIVE DIAGNOSIS: Small bowel obstruction.

 

POSTOPERATIVE DIAGNOSES: Small bowel obstruction with metastatic pancreatic

cancer with peritoneal and multiple small bowel masses.

 

OPERATIVE PROCEDURE: Laparoscopic evaluation with mini laparotomy and small bowel

resection, primary anastomosis.

 

SURGEON: Marshall Davidson DO

 

ASSISTANT: Leo Gosselin, MD who assisted with evaluation of colon, retraction and

creation of small bowel anastomosis.

 

ANESTHESIA: General.

 

ESTIMATED BLOOD LOSS: 20 mL

 

COMPLICATIONS: None.

 

INDICATION FOR THE PROCEDURE:

The patient is 85-year-old male with metastatic pancreatic cancer with small

bowel obstruction secondary to metastatic disease in the small bowel.

Recommendation was to proceed with exploratory laparoscopy, possible laparotomy.

The risks and benefits of the procedure not limited to but including bleeding,

infection, hernia formation, damage to surrounding structures, need for further

surgery were discussed in detail with the patient and informed was obtained and

procedure was planned.

 

DESCRIPTION OF PROCEDURE

The patient brought back to operating room three and after sufficient sedation

the abdomen was sterilely prepped and draped and a English catheter was placed.

Next a time-out was done to confirm proper patient and proper procedure.

Following that, a 5 mm incision made in the left lower quadrant, Veress needle

inserted and the abdomen was insufflated to 50 mmHg.  Next, the Veress needle was

removed.  A 5 m OptiVu port was used to gain access to the abdomen. Once the

abdomen was entered, there was lesions in the omentum, transverse colon,

peritoneum and in small bowel identified diffusely.  Another 5 mm port was placed

in the left lower abdomen.  The small bowel was run proximal to distal

identifying a transition point with a large mass in the colon and the mesentery.

There was decompressed bowel proximal and distal to this point. I was able to

mobilize this point to the midline.  We made a midline laparotomy approximately 6

cm in length, decompressed the abdomen and brought the lesion out through the

abdominal wall. I was able to create two windows in the mesentery, proximal and

distal, then using the Enseal was able to cut through the small bowel mesentery

and between the two ends.  #3-0 Vicryl suture was used to reapproximate the

side-to-side loops of small bowel proximally and distally.  Two small

enterotomies were made and MAGDI 75 stapler blue load was used to create a

side-to-side anastomosis.

 

Once that was completed it was tucked back inside the abdomen. Some Zeb was

placed along the mesentery where there was some oozing.  The abdomen was then

examined one last time to confirm hemostasis.  The fascia was then reapproximated

with interrupted #0 Vicryl figure-of-eight sutures.  The skin was brought back

together with staples. The abdomen was cleaned and dried, 4x4 and tape were

applied thus ending the procedure.

## 2019-08-01 NOTE — IPNPDOC
Text Note


Date of Service


The patient was seen on 8/1/19.





NOTE


No acute events overnight. He denies any nausea, emesis, fevers, abd pains, or 

distention. He is passing flatus, but no BM in last 24 hours. 





VSSAF





NAD





abd - soft, non distended, NT





labs - see below





A) 86y/o male with partial SBO secondary to metastatic pancreatic CA that is 

resolving





P)


ambulate


clamp NGT


clq diet


If he tolerates is clamped today with clears then we can attempt removal again 

tomorrow and keep him on the clq diet. If he can't tolerate clears, then 


he will need surgery for either a resection and anastamosis vs. diverting 

ostomy. Either way, that will postpone any chemo for atleast 2 weeks. I will see

him in the am to determine the 


best next step. 








Buzz Davidson DO





VS,Fishbone, I+O


VS, Fishbone, I+O


Laboratory Tests


8/1/19 06:07








Red Blood Count 3.40 L, Mean Corpuscular Volume 99.1 H, Mean Corpuscular 

Hemoglobin 32.4, Mean Corpuscular Hemoglobin Concent 32.6, Red Cell Distribution

Width 12.9, Neutrophils (%) (Auto) 85.2 H, Lymphocytes (%) (Auto) 8.2 L, 

Monocytes (%) (Auto) 4.0, Eosinophils (%) (Auto) 1.2, Basophils (%) (Auto) 0.3, 

Neutrophils # (Auto) 15.3 H, Lymphocytes # (Auto) 1.5, Monocytes # (Auto) 0.7, 

Eosinophils # (Auto) 0.2, Basophils # (Auto) 0.1, Calcium Level 8.0 L








Vital Signs








  Date Time  Temp Pulse Resp B/P (MAP) Pulse Ox O2 Delivery O2 Flow Rate FiO2


 


8/1/19 06:00 98.2 76 18 138/63 (88) 91   


 


7/26/19 14:15      Room Air  














I&O- Last 24 Hours up to 6 AM 


 


 8/1/19





 06:00


 


Intake Total 720 ml


 


Output Total 2075 ml


 


Balance -1355 ml

















RAO DAVIDSON DO             Aug 1, 2019 10:18

## 2019-08-02 VITALS — SYSTOLIC BLOOD PRESSURE: 122 MMHG | DIASTOLIC BLOOD PRESSURE: 63 MMHG

## 2019-08-02 VITALS — DIASTOLIC BLOOD PRESSURE: 63 MMHG | SYSTOLIC BLOOD PRESSURE: 123 MMHG

## 2019-08-02 LAB
BASOPHILS # BLD AUTO: 0 10^3/UL (ref 0–0.2)
BASOPHILS NFR BLD AUTO: 0.2 % (ref 0–1)
BUN SERPL-MCNC: 14 MG/DL (ref 7–18)
CALCIUM SERPL-MCNC: 7.7 MG/DL (ref 8.8–10.2)
CHLORIDE SERPL-SCNC: 98 MEQ/L (ref 98–107)
CO2 SERPL-SCNC: 34 MEQ/L (ref 21–32)
CREAT SERPL-MCNC: 1.02 MG/DL (ref 0.7–1.3)
EOSINOPHIL # BLD AUTO: 0 10^3/UL (ref 0–0.5)
EOSINOPHIL NFR BLD AUTO: 0.1 % (ref 0–3)
GFR SERPL CREATININE-BSD FRML MDRD: > 60 ML/MIN/{1.73_M2} (ref 35–?)
GLUCOSE SERPL-MCNC: 145 MG/DL (ref 70–100)
HCT VFR BLD AUTO: 33.4 % (ref 42–52)
HGB BLD-MCNC: 11 G/DL (ref 13.5–17.5)
LYMPHOCYTES # BLD AUTO: 1.1 10^3/UL (ref 1.5–4.5)
LYMPHOCYTES NFR BLD AUTO: 7.3 % (ref 24–44)
MAGNESIUM SERPL-MCNC: 2.5 MG/DL (ref 1.8–2.4)
MCH RBC QN AUTO: 31.4 PG (ref 27–33)
MCHC RBC AUTO-ENTMCNC: 32.9 G/DL (ref 32–36.5)
MCV RBC AUTO: 95.4 FL (ref 80–96)
MONOCYTES # BLD AUTO: 0.5 10^3/UL (ref 0–0.8)
MONOCYTES NFR BLD AUTO: 3.4 % (ref 0–5)
NEUTROPHILS # BLD AUTO: 13.4 10^3/UL (ref 1.8–7.7)
NEUTROPHILS NFR BLD AUTO: 87.8 % (ref 36–66)
PLATELET # BLD AUTO: 245 10^3/UL (ref 150–450)
POTASSIUM SERPL-SCNC: 3.7 MEQ/L (ref 3.5–5.1)
RBC # BLD AUTO: 3.5 10^6/UL (ref 4.3–6.1)
SODIUM SERPL-SCNC: 138 MEQ/L (ref 136–145)
WBC # BLD AUTO: 15.3 10^3/UL (ref 4–10)

## 2019-08-02 RX ADMIN — DEXTROSE MONOHYDRATE SCH MLS/HR: 5 INJECTION INTRAVENOUS at 15:12

## 2019-08-02 RX ADMIN — KETOROLAC TROMETHAMINE PRN MG: 30 INJECTION, SOLUTION INTRAMUSCULAR at 15:13

## 2019-08-02 RX ADMIN — DEXTROSE MONOHYDRATE SCH MLS/HR: 5 INJECTION INTRAVENOUS at 21:34

## 2019-08-02 RX ADMIN — POTASSIUM CHLORIDE SCH MEQ: 750 TABLET, EXTENDED RELEASE ORAL at 15:12

## 2019-08-02 RX ADMIN — SODIUM CHLORIDE, PRESERVATIVE FREE SCH ML: 5 INJECTION INTRAVENOUS at 05:44

## 2019-08-02 RX ADMIN — DEXTROSE MONOHYDRATE SCH MLS/HR: 5 INJECTION INTRAVENOUS at 08:57

## 2019-08-02 RX ADMIN — FINASTERIDE SCH MG: 5 TABLET, FILM COATED ORAL at 21:33

## 2019-08-02 RX ADMIN — POTASSIUM CHLORIDE SCH MEQ: 750 TABLET, EXTENDED RELEASE ORAL at 08:56

## 2019-08-02 RX ADMIN — DEXTROSE MONOHYDRATE SCH MLS/HR: 5 INJECTION INTRAVENOUS at 03:20

## 2019-08-02 RX ADMIN — MORPHINE SULFATE PRN MG: 4 INJECTION INTRAVENOUS at 09:11

## 2019-08-02 RX ADMIN — KETOROLAC TROMETHAMINE PRN MG: 30 INJECTION, SOLUTION INTRAMUSCULAR at 05:44

## 2019-08-02 RX ADMIN — SODIUM CHLORIDE, PRESERVATIVE FREE SCH ML: 5 INJECTION INTRAVENOUS at 22:00

## 2019-08-02 RX ADMIN — HYDROCODONE BITARTRATE AND ACETAMINOPHEN PRN TAB: 5; 325 TABLET ORAL at 09:12

## 2019-08-02 RX ADMIN — HYDROCODONE BITARTRATE AND ACETAMINOPHEN PRN TAB: 5; 325 TABLET ORAL at 15:13

## 2019-08-02 RX ADMIN — POTASSIUM CHLORIDE SCH MEQ: 750 TABLET, EXTENDED RELEASE ORAL at 21:34

## 2019-08-02 RX ADMIN — DOCUSATE SODIUM,SENNOSIDES SCH TAB: 50; 8.6 TABLET, FILM COATED ORAL at 08:57

## 2019-08-02 RX ADMIN — SODIUM CHLORIDE, PRESERVATIVE FREE SCH ML: 5 INJECTION INTRAVENOUS at 15:12

## 2019-08-02 RX ADMIN — DOCUSATE SODIUM,SENNOSIDES SCH TAB: 50; 8.6 TABLET, FILM COATED ORAL at 21:34

## 2019-08-02 RX ADMIN — DEXTROSE AND SODIUM CHLORIDE SCH MLS/HR: 5; 450 INJECTION, SOLUTION INTRAVENOUS at 08:57

## 2019-08-02 NOTE — IPNPDOC
Text Note


Date of Service


The patient was seen on 8/2/19.





NOTE


Last evening he went to the OR for SBR due to an obstruction that was failing 

medical treatment. He denies any nausea, emesis, fevers, abd pains, or 

distention. He is passing flatus, but no BM. 





VSSAF





NAD





abd - soft, non distended, TTP appropriate, dressing c/d/i





labs - see below





A) 84y/o male with partial SBO secondary to metastatic pancreatic CA


POD#1 s/p sbr with primary anastamosis





P)


ambulate


clamp NGT during meals, will dc after a BM


clq diet


will d/c ngt and start regular diet after a BM, and plan to d/c home once 

tolerating reg diet





Buzz Davidson DO





VS,Hamzah, I+O


VS, Hamzah, I+O


Laboratory Tests


8/2/19 06:09








Red Blood Count 3.50 L, Mean Corpuscular Volume 95.4, Mean Corpuscular 

Hemoglobin 31.4, Mean Corpuscular Hemoglobin Concent 32.9, Red Cell Distribution

Width 13.0, Neutrophils (%) (Auto) 87.8 H, Lymphocytes (%) (Auto) 7.3 L, 

Monocytes (%) (Auto) 3.4, Eosinophils (%) (Auto) 0.1, Basophils (%) (Auto) 0.2, 

Neutrophils # (Auto) 13.4 H, Lymphocytes # (Auto) 1.1 L, Monocytes # (Auto) 0.5,

Eosinophils # (Auto) 0.0, Basophils # (Auto) 0.0, Calcium Level 7.7 L








Vital Signs








  Date Time  Temp Pulse Resp B/P (MAP) Pulse Ox O2 Delivery O2 Flow Rate FiO2


 


8/2/19 01:00 98.0 70 13 123/63 (83) 94  1.0 














I&O- Last 24 Hours up to 6 AM 


 


 8/2/19





 06:00


 


Intake Total 1100 ml


 


Output Total 1495 ml


 


Balance -395 ml

















RAO DAVIDSON DO             Aug 2, 2019 09:07

## 2019-08-02 NOTE — IPNPDOC
Subjective


Date Seen


The patient was seen on 8/2/19.





Subjective


Chief Complaint/HPI


85m with hx of bph admitted with sbo and found to have pancreatic cancer





went to OR last night for sbo. feeling better today.





Full ROS was performed and negative except as documented above





Objective


Physical Examination


General Exam:  Positive: Alert, Cooperative, No Acute Distress


Eye Exam:  Positive: PERRLA, Conjunctiva & lids normal, EOMI


ENT Exam:  Positive: Atraumatic, Mucous membr. moist/pink, Pharynx Normal


Neck Exam:  Positive: Supple; 


   Negative: JVD, thyromegaly


Chest Exam:  Positive: Clear to auscultation, Normal air movement


Heart Exam:  Positive: Rate Normal, Regular Rhythm, Normal S1, Normal S2; 


   Negative: Murmurs, Rubs


Abdomen Exam:  Positive: Normal bowel sounds, Soft; 


   Negative: Tenderness, Hepatospenomegaly


Extremity Exam:  Positive: Normal pulses; 


   Negative: Clubbing, Cyanosis, Edema


Skin Exam:  Positive: Nl turgor and temperature; 


   Negative: Rash, Breakdown


Neuro Exam:  Positive: Normal Gait, Normal Speech, Cranial Nerves 3-12 NL, 

Reflexes 2+


Psych Exam:  Positive: Mental status NL, Mood NL, Oriented x 3





Assessment /Plan


Assessment


85m p/w high grade sbo and newly diagnosed metastatic pancreatic cancer 





Pancreatic cancer


mets to mesentery


oncology consulting


prognosis poor








SBO


had surgery last night


post op care per surgeon


adv diet and remove ng when having bms





Plan/VTE


VTE Prophylaxis Ordered?:  Yes (lovenox)





VS, I&O, 24H, Fishbone


Vital Signs/I&O





Vital Signs








  Date Time  Temp Pulse Resp B/P (MAP) Pulse Ox O2 Delivery O2 Flow Rate FiO2


 


8/2/19 15:44   18     


 


8/2/19 09:20       2.0 


 


8/2/19 01:00 98.0 70  123/63 (83) 94   














I&O- Last 24 Hours up to 6 AM 


 


 8/2/19





 06:00


 


Intake Total 1100 ml


 


Output Total 1495 ml


 


Balance -395 ml











Laboratory Data


24H LABS


Laboratory Tests 2


8/2/19 06:09: 


Immature Granulocyte % (Auto) 1.2, White Blood Count 15.3H, Red Blood Count 

3.50L, Hemoglobin 11.0L, Hematocrit 33.4L, Mean Corpuscular Volume 95.4, Mean 

Corpuscular Hemoglobin 31.4, Mean Corpuscular Hemoglobin Concent 32.9, Red Cell 

Distribution Width 13.0, Platelet Count 245, Neutrophils (%) (Auto) 87.8H, 

Lymphocytes (%) (Auto) 7.3L, Monocytes (%) (Auto) 3.4, Eosinophils (%) (Auto) 

0.1, Basophils (%) (Auto) 0.2, Neutrophils # (Auto) 13.4H, Lymphocytes # (Auto) 

1.1L, Monocytes # (Auto) 0.5, Eosinophils # (Auto) 0.0, Basophils # (Auto) 0.0, 

Nucleated Red Blood Cells % (auto) 0.0, Anion Gap 6L, Glomerular Filtration Rate

> 60.0, Blood Urea Nitrogen 14, Creatinine 1.02, Sodium Level 138, Potassium 

Level 3.7, Chloride Level 98, Carbon Dioxide Level 34H, Calcium Level 7.7L, 

Magnesium Level 2.5H


CBC/BMP


Laboratory Tests


8/2/19 06:09








Red Blood Count 3.50 L, Mean Corpuscular Volume 95.4, Mean Corpuscular 

Hemoglobin 31.4, Mean Corpuscular Hemoglobin Concent 32.9, Red Cell Distribution

Width 13.0, Neutrophils (%) (Auto) 87.8 H, Lymphocytes (%) (Auto) 7.3 L, 

Monocytes (%) (Auto) 3.4, Eosinophils (%) (Auto) 0.1, Basophils (%) (Auto) 0.2, 

Neutrophils # (Auto) 13.4 H, Lymphocytes # (Auto) 1.1 L, Monocytes # (Auto) 0.5,

Eosinophils # (Auto) 0.0, Basophils # (Auto) 0.0, Calcium Level 7.7 L











FREEDOM DOTSON MD           Aug 2, 2019 18:14

## 2019-08-03 VITALS — SYSTOLIC BLOOD PRESSURE: 173 MMHG | DIASTOLIC BLOOD PRESSURE: 85 MMHG

## 2019-08-03 VITALS — DIASTOLIC BLOOD PRESSURE: 64 MMHG | SYSTOLIC BLOOD PRESSURE: 130 MMHG

## 2019-08-03 VITALS — SYSTOLIC BLOOD PRESSURE: 133 MMHG | DIASTOLIC BLOOD PRESSURE: 68 MMHG

## 2019-08-03 LAB
BASOPHILS # BLD AUTO: 0.1 10^3/UL (ref 0–0.2)
BASOPHILS NFR BLD AUTO: 0.4 % (ref 0–1)
BUN SERPL-MCNC: 16 MG/DL (ref 7–18)
CALCIUM SERPL-MCNC: 7.7 MG/DL (ref 8.8–10.2)
CHLORIDE SERPL-SCNC: 97 MEQ/L (ref 98–107)
CO2 SERPL-SCNC: 37 MEQ/L (ref 21–32)
CREAT SERPL-MCNC: 0.91 MG/DL (ref 0.7–1.3)
EOSINOPHIL # BLD AUTO: 0.2 10^3/UL (ref 0–0.5)
EOSINOPHIL NFR BLD AUTO: 1.2 % (ref 0–3)
GFR SERPL CREATININE-BSD FRML MDRD: > 60 ML/MIN/{1.73_M2} (ref 35–?)
GLUCOSE SERPL-MCNC: 122 MG/DL (ref 70–100)
HCT VFR BLD AUTO: 32.5 % (ref 42–52)
HGB BLD-MCNC: 10.7 G/DL (ref 13.5–17.5)
LYMPHOCYTES # BLD AUTO: 1.6 10^3/UL (ref 1.5–4.5)
LYMPHOCYTES NFR BLD AUTO: 12.6 % (ref 24–44)
MAGNESIUM SERPL-MCNC: 2.5 MG/DL (ref 1.8–2.4)
MCH RBC QN AUTO: 32.3 PG (ref 27–33)
MCHC RBC AUTO-ENTMCNC: 32.9 G/DL (ref 32–36.5)
MCV RBC AUTO: 98.2 FL (ref 80–96)
MONOCYTES # BLD AUTO: 1 10^3/UL (ref 0–0.8)
MONOCYTES NFR BLD AUTO: 8 % (ref 0–5)
NEUTROPHILS # BLD AUTO: 9.7 10^3/UL (ref 1.8–7.7)
NEUTROPHILS NFR BLD AUTO: 76.2 % (ref 36–66)
PLATELET # BLD AUTO: 223 10^3/UL (ref 150–450)
POTASSIUM SERPL-SCNC: 3.1 MEQ/L (ref 3.5–5.1)
RBC # BLD AUTO: 3.31 10^6/UL (ref 4.3–6.1)
SODIUM SERPL-SCNC: 138 MEQ/L (ref 136–145)
WBC # BLD AUTO: 12.7 10^3/UL (ref 4–10)

## 2019-08-03 RX ADMIN — SODIUM CHLORIDE, PRESERVATIVE FREE SCH ML: 5 INJECTION INTRAVENOUS at 15:42

## 2019-08-03 RX ADMIN — POTASSIUM CHLORIDE SCH MEQ: 750 TABLET, EXTENDED RELEASE ORAL at 15:43

## 2019-08-03 RX ADMIN — POTASSIUM CHLORIDE SCH MEQ: 750 TABLET, EXTENDED RELEASE ORAL at 09:00

## 2019-08-03 RX ADMIN — DEXTROSE AND SODIUM CHLORIDE SCH MLS/HR: 5; 450 INJECTION, SOLUTION INTRAVENOUS at 15:43

## 2019-08-03 RX ADMIN — DEXTROSE MONOHYDRATE SCH MLS/HR: 5 INJECTION INTRAVENOUS at 08:59

## 2019-08-03 RX ADMIN — DEXTROSE AND SODIUM CHLORIDE SCH MLS/HR: 5; 450 INJECTION, SOLUTION INTRAVENOUS at 01:53

## 2019-08-03 RX ADMIN — DOCUSATE SODIUM,SENNOSIDES SCH TAB: 50; 8.6 TABLET, FILM COATED ORAL at 09:03

## 2019-08-03 RX ADMIN — SODIUM CHLORIDE, PRESERVATIVE FREE SCH ML: 5 INJECTION INTRAVENOUS at 22:00

## 2019-08-03 RX ADMIN — DEXTROSE MONOHYDRATE SCH MLS/HR: 5 INJECTION INTRAVENOUS at 21:19

## 2019-08-03 RX ADMIN — DOCUSATE SODIUM,SENNOSIDES SCH TAB: 50; 8.6 TABLET, FILM COATED ORAL at 21:19

## 2019-08-03 RX ADMIN — KETOROLAC TROMETHAMINE PRN MG: 30 INJECTION, SOLUTION INTRAMUSCULAR at 09:44

## 2019-08-03 RX ADMIN — FINASTERIDE SCH MG: 5 TABLET, FILM COATED ORAL at 21:19

## 2019-08-03 RX ADMIN — POTASSIUM CHLORIDE SCH MLS/HR: 7.46 INJECTION, SOLUTION INTRAVENOUS at 09:43

## 2019-08-03 RX ADMIN — HYDROCODONE BITARTRATE AND ACETAMINOPHEN PRN TAB: 5; 325 TABLET ORAL at 06:30

## 2019-08-03 RX ADMIN — DEXTROSE MONOHYDRATE SCH MLS/HR: 5 INJECTION INTRAVENOUS at 15:42

## 2019-08-03 RX ADMIN — KETOROLAC TROMETHAMINE PRN MG: 30 INJECTION, SOLUTION INTRAMUSCULAR at 03:04

## 2019-08-03 RX ADMIN — POTASSIUM CHLORIDE SCH MLS/HR: 7.46 INJECTION, SOLUTION INTRAVENOUS at 09:44

## 2019-08-03 RX ADMIN — HYDROCODONE BITARTRATE AND ACETAMINOPHEN PRN TAB: 5; 325 TABLET ORAL at 21:19

## 2019-08-03 RX ADMIN — POTASSIUM CHLORIDE SCH MLS/HR: 7.46 INJECTION, SOLUTION INTRAVENOUS at 09:00

## 2019-08-03 RX ADMIN — DEXTROSE MONOHYDRATE SCH MLS/HR: 5 INJECTION INTRAVENOUS at 03:04

## 2019-08-03 RX ADMIN — POTASSIUM CHLORIDE SCH MEQ: 750 TABLET, EXTENDED RELEASE ORAL at 21:20

## 2019-08-03 RX ADMIN — SODIUM CHLORIDE, PRESERVATIVE FREE SCH ML: 5 INJECTION INTRAVENOUS at 05:47

## 2019-08-03 NOTE — REP
Clinical:  Follow up small bowel obstruction.

 

Technique:  Two supine views of the abdomen and pelvis.

 

Findings:

Nasogastric tube identified below left hemidiaphragm.  Evidence of prior

cholecystectomy.  Midline surgical skin staples consistent with recent

laparoscopy for small bowel obstruction.  The bowel gas pattern is nonspecific.

Skeletal structures stable.

 

Impression:

Nonspecific bowel gas pattern.

 

 

Electronically Signed by

See Echols MD 08/03/2019 12:15 P

## 2019-08-03 NOTE — IPNPDOC
Subjective


Date Seen


The patient was seen on 8/3/19.





Subjective


Chief Complaint/HPI


85m with hx of bph admitted with sbo and found to have pancreatic cancer





pt sore, tolerating clear liquids, having flatus but no bms


kub without evidence of obstruction





Full ROS was performed and negative except as documented above








Objective


Physical Examination


General Exam:  Positive: Alert, Cooperative, No Acute Distress


Eye Exam:  Positive: PERRLA, Conjunctiva & lids normal, EOMI


ENT Exam:  Positive: Atraumatic, Mucous membr. moist/pink, Pharynx Normal


Neck Exam:  Positive: Supple; 


   Negative: JVD, thyromegaly


Chest Exam:  Positive: Clear to auscultation, Normal air movement


Heart Exam:  Positive: Rate Normal, Regular Rhythm, Normal S1, Normal S2; 


   Negative: Murmurs, Rubs


Abdomen Exam:  Positive: Normal bowel sounds, Soft; 


   Negative: Tenderness, Hepatospenomegaly


Extremity Exam:  Positive: Normal pulses; 


   Negative: Clubbing, Cyanosis, Edema


Skin Exam:  Positive: Nl turgor and temperature; 


   Negative: Rash, Breakdown


Neuro Exam:  Positive: Normal Gait, Normal Speech, Cranial Nerves 3-12 NL, 

Reflexes 2+


Psych Exam:  Positive: Mental status NL, Mood NL, Oriented x 3





Assessment /Plan


Assessment


85m p/w high grade sbo and newly diagnosed metastatic pancreatic cancer 





Pancreatic cancer


mets to mesentery


oncology consulting


prognosis poor








SBO


pod2


post op care per surgeon


adv diet and remove ng when having bms





Plan/VTE


VTE Prophylaxis Ordered?:  Yes (lovenox)





VS, I&O, 24H, Fishbone


Vital Signs/I&O





Vital Signs








  Date Time  Temp Pulse Resp B/P (MAP) Pulse Ox O2 Delivery O2 Flow Rate FiO2


 


8/3/19 14:00 98.0 70 18 133/68 (89) 95   


 


8/3/19 09:40       2.0 














I&O- Last 24 Hours up to 6 AM 


 


 8/3/19





 06:00


 


Intake Total 1980 ml


 


Output Total 2675 ml


 


Balance -695 ml











Laboratory Data


24H LABS


Laboratory Tests 2


8/3/19 05:53: 


Immature Granulocyte % (Auto) 1.6, White Blood Count 12.7H, Red Blood Count 

3.31L, Hemoglobin 10.7L, Hematocrit 32.5L, Mean Corpuscular Volume 98.2H, Mean 

Corpuscular Hemoglobin 32.3, Mean Corpuscular Hemoglobin Concent 32.9, Red Cell 

Distribution Width 12.9, Platelet Count 223, Neutrophils (%) (Auto) 76.2H, 

Lymphocytes (%) (Auto) 12.6L, Monocytes (%) (Auto) 8.0H, Eosinophils (%) (Auto) 

1.2, Basophils (%) (Auto) 0.4, Neutrophils # (Auto) 9.7H, Lymphocytes # (Auto) 

1.6, Monocytes # (Auto) 1.0H, Eosinophils # (Auto) 0.2, Basophils # (Auto) 0.1, 

Nucleated Red Blood Cells % (auto) 0.0, Anion Gap 4L, Glomerular Filtration Rate

> 60.0, Blood Urea Nitrogen 16, Creatinine 0.91, Sodium Level 138, Potassium Le

rory 3.1L, Chloride Level 97L, Carbon Dioxide Level 37H, Calcium Level 7.7L, 

Magnesium Level 2.5H


CBC/BMP


Laboratory Tests


8/3/19 05:53








Red Blood Count 3.31 L, Mean Corpuscular Volume 98.2 H, Mean Corpuscular 

Hemoglobin 32.3, Mean Corpuscular Hemoglobin Concent 32.9, Red Cell Distribution

Width 12.9, Neutrophils (%) (Auto) 76.2 H, Lymphocytes (%) (Auto) 12.6 L, 

Monocytes (%) (Auto) 8.0 H, Eosinophils (%) (Auto) 1.2, Basophils (%) (Auto) 

0.4, Neutrophils # (Auto) 9.7 H, Lymphocytes # (Auto) 1.6, Monocytes # (Auto) 

1.0 H, Eosinophils # (Auto) 0.2, Basophils # (Auto) 0.1, Calcium Level 7.7 L











FREEDOM DOTSON MD           Aug 3, 2019 17:51

## 2019-08-03 NOTE — IPN
DATE:  08/03/2019

 

HISTORY:  The patient is now postop day #2 from laparotomy and small bowel

resection for relief of a small bowel obstruction secondary to metastatic

pancreatic cancer.  The patient has been allowed to take clear liquids, though he

has an nasogastric (NG) tube still in place to low intermittent suction.  It

seems that most if not all of the fluid he is taking and is coming back out

through the NG tube at this point.  He does report having had flatus several

times but no bowel movement as of yet.

 

Vital signs: Show that he has been afebrile for the past 24 hours.  His pulse is

in the 70s, and his blood pressure is good.  Room air oxygen saturations are in

the low 90s.

 

Intake and output shows yesterday that there is 1980 recorded in and 1700

recorded out.  He has been producing adequate urine output.

 

PHYSICAL EXAMINATION:  Shows a pleasant, older gentleman lying quietly in bed.

He appears comfortable.  The nasogastric tube is draining some mostly watery

fluid with a few bits of darker material.  He is alert and oriented.  Heart exam

shows a regular rhythm and the lungs show distant breath sounds.  The abdomen is

mildly full but he has bowel sounds present.  His incisions are dressed.  Calves

are nontender to palpation.

 

Laboratory studies show white count of 13 with a hemoglobin of 11, hematocrit of

32, and a platelet count of 223,000.  Differential count shows 76% neutrophils,

13% lymphocytes, and 8% monocytes.  Chemistry profile shows a potassium of 3.1,

CO2 of 37, BUN 16, creatinine 0.9, and a glucose of 122.

 

IMPRESSION:  The patient is stable, now 2 days postop from small bowel resection

for relief of a small bowel obstruction.  He is certainly at risk for

re-obstruction given his known extensive metastases.  He has had some flatus but

has had no bowel function yet and he still is putting out most of the ingested

fluids through his NG tube.

 

PLAN:  The patient will be allowed to continue his clear liquids and the NG tube

will be continued as well.  His current medications will be continued.  He is

receiving potassium supplementation both orally and intravenously (IV).  I will

obtain a KUB to assess his bowel gas pattern to see if there is evidence for

resolution of his bowel obstruction.

## 2019-08-04 VITALS — SYSTOLIC BLOOD PRESSURE: 134 MMHG | DIASTOLIC BLOOD PRESSURE: 69 MMHG

## 2019-08-04 VITALS — DIASTOLIC BLOOD PRESSURE: 75 MMHG | SYSTOLIC BLOOD PRESSURE: 143 MMHG

## 2019-08-04 VITALS — SYSTOLIC BLOOD PRESSURE: 135 MMHG | DIASTOLIC BLOOD PRESSURE: 71 MMHG

## 2019-08-04 LAB
BUN SERPL-MCNC: 12 MG/DL (ref 7–18)
CALCIUM SERPL-MCNC: 8.2 MG/DL (ref 8.8–10.2)
CHLORIDE SERPL-SCNC: 100 MEQ/L (ref 98–107)
CO2 SERPL-SCNC: 34 MEQ/L (ref 21–32)
CREAT SERPL-MCNC: 0.83 MG/DL (ref 0.7–1.3)
GFR SERPL CREATININE-BSD FRML MDRD: > 60 ML/MIN/{1.73_M2} (ref 35–?)
GLUCOSE SERPL-MCNC: 126 MG/DL (ref 70–100)
POTASSIUM SERPL-SCNC: 3.5 MEQ/L (ref 3.5–5.1)
SODIUM SERPL-SCNC: 137 MEQ/L (ref 136–145)

## 2019-08-04 RX ADMIN — POTASSIUM CHLORIDE SCH MEQ: 750 TABLET, EXTENDED RELEASE ORAL at 08:51

## 2019-08-04 RX ADMIN — FINASTERIDE SCH MG: 5 TABLET, FILM COATED ORAL at 20:51

## 2019-08-04 RX ADMIN — DEXTROSE MONOHYDRATE SCH MLS/HR: 5 INJECTION INTRAVENOUS at 08:50

## 2019-08-04 RX ADMIN — DOCUSATE SODIUM,SENNOSIDES SCH TAB: 50; 8.6 TABLET, FILM COATED ORAL at 08:51

## 2019-08-04 RX ADMIN — DEXTROSE MONOHYDRATE SCH MLS/HR: 5 INJECTION INTRAVENOUS at 03:17

## 2019-08-04 RX ADMIN — SODIUM CHLORIDE, PRESERVATIVE FREE SCH ML: 5 INJECTION INTRAVENOUS at 22:00

## 2019-08-04 RX ADMIN — HYDROCODONE BITARTRATE AND ACETAMINOPHEN PRN TAB: 5; 325 TABLET ORAL at 08:51

## 2019-08-04 RX ADMIN — POTASSIUM CHLORIDE SCH MEQ: 750 TABLET, EXTENDED RELEASE ORAL at 20:51

## 2019-08-04 RX ADMIN — DOCUSATE SODIUM,SENNOSIDES SCH TAB: 50; 8.6 TABLET, FILM COATED ORAL at 20:51

## 2019-08-04 RX ADMIN — SODIUM CHLORIDE, PRESERVATIVE FREE SCH ML: 5 INJECTION INTRAVENOUS at 06:00

## 2019-08-04 RX ADMIN — DEXTROSE AND SODIUM CHLORIDE SCH MLS/HR: 5; 450 INJECTION, SOLUTION INTRAVENOUS at 00:09

## 2019-08-04 RX ADMIN — POTASSIUM CHLORIDE SCH MEQ: 750 TABLET, EXTENDED RELEASE ORAL at 15:10

## 2019-08-04 RX ADMIN — SODIUM CHLORIDE, PRESERVATIVE FREE SCH ML: 5 INJECTION INTRAVENOUS at 14:00

## 2019-08-04 RX ADMIN — DOCUSATE SODIUM,SENNOSIDES SCH TAB: 50; 8.6 TABLET, FILM COATED ORAL at 20:52

## 2019-08-04 RX ADMIN — KETOROLAC TROMETHAMINE PRN MG: 30 INJECTION, SOLUTION INTRAMUSCULAR at 08:51

## 2019-08-04 NOTE — IPNPDOC
Subjective


Date Seen


The patient was seen on 8/4/19.





Subjective


Chief Complaint/HPI


85m with hx of bph admitted with sbo and found to have pancreatic cancer





feeling well today, ng tube is out, tolerating clears, no bms





Full ROS was performed and negative except as documented above





Objective


Physical Examination


General Exam:  Positive: Alert, Cooperative, No Acute Distress


Eye Exam:  Positive: PERRLA, Conjunctiva & lids normal, EOMI


ENT Exam:  Positive: Atraumatic, Mucous membr. moist/pink, Pharynx Normal


Neck Exam:  Positive: Supple; 


   Negative: JVD, thyromegaly


Chest Exam:  Positive: Clear to auscultation, Normal air movement


Heart Exam:  Positive: Rate Normal, Regular Rhythm, Normal S1, Normal S2; 


   Negative: Murmurs, Rubs


Abdomen Exam:  Positive: Normal bowel sounds, Soft; 


   Negative: Tenderness, Hepatospenomegaly


Extremity Exam:  Positive: Normal pulses; 


   Negative: Clubbing, Cyanosis, Edema


Skin Exam:  Positive: Nl turgor and temperature; 


   Negative: Rash, Breakdown


Neuro Exam:  Positive: Normal Gait, Normal Speech, Cranial Nerves 3-12 NL, 

Reflexes 2+


Psych Exam:  Positive: Mental status NL, Mood NL, Oriented x 3





Assessment /Plan


Assessment


85m p/w high grade sbo and newly diagnosed metastatic pancreatic cancer 





Pancreatic cancer


mets to mesentery


oncology consulting


prognosis poor








SBO


s/p surgical resection


post op care per surgeon


adv diet when having bms





Plan/VTE


VTE Prophylaxis Ordered?:  Yes (lovenox)





VS, I&O, 24H, Fishbone


Vital Signs/I&O





Vital Signs








  Date Time  Temp Pulse Resp B/P (MAP) Pulse Ox O2 Delivery O2 Flow Rate FiO2


 


8/4/19 14:00 98.6 80 18 135/71 (92) 94   


 


8/3/19 09:40       2.0 














I&O- Last 24 Hours up to 6 AM 


 


 8/4/19





 05:59


 


Intake Total 1980 ml


 


Output Total 1200 ml


 


Balance 780 ml











Laboratory Data


24H LABS


Laboratory Tests 2


8/4/19 08:09: 


Anion Gap 3L, Glomerular Filtration Rate > 60.0, Blood Urea Nitrogen 12, 

Creatinine 0.83, Sodium Level 137, Potassium Level 3.5, Chloride Level 100, Car

bon Dioxide Level 34H, Calcium Level 8.2L


CBC/BMP


Laboratory Tests


8/4/19 08:09








Calcium Level 8.2 L











FREEDOM DOTSON MD           Aug 4, 2019 18:55

## 2019-08-04 NOTE — IPN
DATE:  08/04/2019

 

TIME:  12:15 p.m.

 

HISTORY:

The patient is now postop day 3 from laparotomy and small bowel resection for

obstruction by metastatic pancreatic cancer.  The nurse reported that his NG tube

came out early this morning, probably around 9 o'clock.  He denies any nausea or

vomiting.  He has been passing some flatus, but has not had a bowel movement.  He

had a KUB yesterday that showed that his oral contrast that had been scattered in

the small bowel loops is now all in his colon and the small bowel dilation seems

almost completely to have resolved.

 

Vital signs show that he has been afebrile over the past 24 hours.  His pulse is

in the 70s.  His blood pressure is good.

 

Intake and output show that yesterday he had 1620 in with 1775 out.

 

PHYSICAL EXAMINATION:

The patient is sitting up in a chair at bedside and looks quite comfortable.  He

is alert and oriented.

Skin is warm and dry.

Heart exam shows a regular rate and rhythm.

The lungs are clear.

The abdomen is mildly full but he has some bowel sounds present, though they are

not active.  He does not have any significant abdominal tenderness.

 

LABORATORY STUDIES:

The patient had a BMP this morning that showed a sodium of 137, potassium 3.5,

chloride 100, CO2 of 34, BUN of 12, creatinine 0.8 and glucose of 126.

 

His KUB from yesterday is as noted in the history today.

 

IMPRESSION:

The patient appears to be doing quite well overall.  He has been taking clear

liquids but we been sucking that all out through his NG tube.  The NG tube has

now been dislodged and I will leave that out for now and see how he does with a

trial of clear liquids.

 

PLAN:

I advised the patient to go slowly with the clear liquids for a while until it is

clear if he is or is not going to tolerate these.  If he tolerates the clear

liquids during the course of the day then I will saline lock his IV and consider

advancing his diet.  I think that based on his KUB and his history so far that

his bowel obstruction has been resolved and that he will have no problem.

## 2019-08-05 VITALS — DIASTOLIC BLOOD PRESSURE: 65 MMHG | SYSTOLIC BLOOD PRESSURE: 136 MMHG

## 2019-08-05 VITALS — DIASTOLIC BLOOD PRESSURE: 68 MMHG | SYSTOLIC BLOOD PRESSURE: 148 MMHG

## 2019-08-05 VITALS — DIASTOLIC BLOOD PRESSURE: 74 MMHG | SYSTOLIC BLOOD PRESSURE: 143 MMHG

## 2019-08-05 LAB
BASOPHILS # BLD AUTO: 0.1 10^3/UL (ref 0–0.2)
BASOPHILS NFR BLD AUTO: 0.4 % (ref 0–1)
BUN SERPL-MCNC: 12 MG/DL (ref 7–18)
CALCIUM SERPL-MCNC: 8.4 MG/DL (ref 8.8–10.2)
CHLORIDE SERPL-SCNC: 106 MEQ/L (ref 98–107)
CO2 SERPL-SCNC: 30 MEQ/L (ref 21–32)
CREAT SERPL-MCNC: 0.78 MG/DL (ref 0.7–1.3)
EOSINOPHIL # BLD AUTO: 0.3 10^3/UL (ref 0–0.5)
EOSINOPHIL NFR BLD AUTO: 2 % (ref 0–3)
GFR SERPL CREATININE-BSD FRML MDRD: > 60 ML/MIN/{1.73_M2} (ref 35–?)
GLUCOSE SERPL-MCNC: 114 MG/DL (ref 70–100)
HCT VFR BLD AUTO: 35.3 % (ref 42–52)
HGB BLD-MCNC: 11.5 G/DL (ref 13.5–17.5)
LYMPHOCYTES # BLD AUTO: 1.9 10^3/UL (ref 1.5–4.5)
LYMPHOCYTES NFR BLD AUTO: 13.4 % (ref 24–44)
MCH RBC QN AUTO: 31.3 PG (ref 27–33)
MCHC RBC AUTO-ENTMCNC: 32.6 G/DL (ref 32–36.5)
MCV RBC AUTO: 95.9 FL (ref 80–96)
MONOCYTES # BLD AUTO: 1.1 10^3/UL (ref 0–0.8)
MONOCYTES NFR BLD AUTO: 7.8 % (ref 0–5)
NEUTROPHILS # BLD AUTO: 10.2 10^3/UL (ref 1.8–7.7)
NEUTROPHILS NFR BLD AUTO: 73.8 % (ref 36–66)
PLATELET # BLD AUTO: 285 10^3/UL (ref 150–450)
POTASSIUM SERPL-SCNC: 4 MEQ/L (ref 3.5–5.1)
RBC # BLD AUTO: 3.68 10^6/UL (ref 4.3–6.1)
SODIUM SERPL-SCNC: 140 MEQ/L (ref 136–145)
WBC # BLD AUTO: 13.8 10^3/UL (ref 4–10)

## 2019-08-05 RX ADMIN — POTASSIUM CHLORIDE SCH MEQ: 750 TABLET, EXTENDED RELEASE ORAL at 21:07

## 2019-08-05 RX ADMIN — SODIUM CHLORIDE, PRESERVATIVE FREE SCH ML: 5 INJECTION INTRAVENOUS at 11:52

## 2019-08-05 RX ADMIN — POTASSIUM CHLORIDE SCH MEQ: 750 TABLET, EXTENDED RELEASE ORAL at 08:34

## 2019-08-05 RX ADMIN — DOCUSATE SODIUM,SENNOSIDES SCH TAB: 50; 8.6 TABLET, FILM COATED ORAL at 21:07

## 2019-08-05 RX ADMIN — SODIUM CHLORIDE, PRESERVATIVE FREE SCH ML: 5 INJECTION INTRAVENOUS at 05:16

## 2019-08-05 RX ADMIN — POTASSIUM CHLORIDE SCH MEQ: 750 TABLET, EXTENDED RELEASE ORAL at 15:33

## 2019-08-05 RX ADMIN — DOCUSATE SODIUM,SENNOSIDES SCH TAB: 50; 8.6 TABLET, FILM COATED ORAL at 08:35

## 2019-08-05 RX ADMIN — SODIUM CHLORIDE, PRESERVATIVE FREE SCH ML: 5 INJECTION INTRAVENOUS at 21:07

## 2019-08-05 RX ADMIN — DOCUSATE SODIUM,SENNOSIDES SCH TAB: 50; 8.6 TABLET, FILM COATED ORAL at 21:00

## 2019-08-05 RX ADMIN — FINASTERIDE SCH MG: 5 TABLET, FILM COATED ORAL at 21:07

## 2019-08-05 NOTE — IPNPDOC
Date Seen


The patient was seen on 8/5/19.





Progress Note








I met with the  family  this afternoon    and we discussed  his recent surgery  

and   present   condition 





THey   have agreed to   HOSPICE     for the  patient  . He lives in [facility] 





Will put in an official consult  to  hospice 





The  possibility of a recurrent  SBO   was discussed with them as well  . The 

pateint  is   eating with  no   issues  of   nausea   or vomiting  and is  

holding down hi s   food 








Thank  you 





Chinyere Daley


Medical  Oncology





VS, I&O, 24H, [place]


Vital Signs/I&O





Vital Signs








  Date Time  Temp Pulse Resp B/P (MAP) Pulse Ox O2 Delivery O2 Flow Rate FiO2


 


8/5/19 14:00 97.7 70 18 136/65 (88) 92   


 


8/3/19 09:40       2.0 














I&O- Last 24 Hours up to 6 AM 


 


 8/5/19





 05:59


 


Intake Total 1100 ml


 


Output Total 2300 ml


 


Balance -1200 ml











Laboratory Data


24H LABS


Laboratory Tests 2


8/5/19 05:18: 


Immature Granulocyte % (Auto) 2.6, White Blood Count 13.8H, Red Blood Count 

3.68L, Hemoglobin 11.5L, Hematocrit 35.3L, Mean Corpuscular Volume 95.9, Mean 

Corpuscular Hemoglobin 31.3, Mean Corpuscular Hemoglobin Concent 32.6, Red Cell 

Distribution Width 12.8, Platelet Count 285, Neutrophils (%) (Auto) 73.8H, 

Lymphocytes (%) (Auto) 13.4L, Monocytes (%) (Auto) 7.8H, Eosinophils (%) (Auto) 

2.0, Basophils (%) (Auto) 0.4, Neutrophils # (Auto) 10.2H, Lymphocytes # (Auto) 

1.9, Monocytes # (Auto) 1.1H, Eosinophils # (Auto) 0.3, Basophils # (Auto) 0.1, 

Nucleated Red Blood Cells % (auto) 0.0, Anion Gap 4L, Glomerular Filtration Rate

> 60.0, Blood Urea Nitrogen 12, Creatinine 0.78, Sodium Level 140, Potassium 

Level 4.0, Chloride Level 106, Carbon Dioxide Level 30, Calcium Level 8.4L


CBC/BMP


Laboratory Tests


8/5/19 05:18








Red Blood Count 3.68 L, Mean Corpuscular Volume 95.9, Mean Corpuscular 

Hemoglobin 31.3, Mean Corpuscular Hemoglobin Concent 32.6, Red Cell Distribution

Width 12.8, Neutrophils (%) (Auto) 73.8 H, Lymphocytes (%) (Auto) 13.4 L, 

Monocytes (%) (Auto) 7.8 H, Eosinophils (%) (Auto) 2.0, Basophils (%) (Auto) 

0.4, Neutrophils # (Auto) 10.2 H, Lymphocytes # (Auto) 1.9, Monocytes # (Auto) 

1.1 H, Eosinophils # (Auto) 0.3, Basophils # (Auto) 0.1, Calcium Level 8.4 L











Chinyere Daley MD                 Aug 5, 2019 18:13

## 2019-08-05 NOTE — IPNPDOC
Subjective


Date Seen


The patient was seen on 8/5/19.





Subjective


Chief Complaint/HPI


85m with hx of bph admitted with sbo and found to have pancreatic cancer





moved bowels last night, tolerating solid diet now





Full ROS was performed and negative except as documented above





Objective


Physical Examination


General Exam:  Positive: Alert, Cooperative, No Acute Distress


Eye Exam:  Positive: PERRLA, Conjunctiva & lids normal, EOMI


ENT Exam:  Positive: Atraumatic, Mucous membr. moist/pink, Pharynx Normal


Neck Exam:  Positive: Supple; 


   Negative: JVD, thyromegaly


Chest Exam:  Positive: Clear to auscultation, Normal air movement


Heart Exam:  Positive: Rate Normal, Regular Rhythm, Normal S1, Normal S2; 


   Negative: Murmurs, Rubs


Abdomen Exam:  Positive: Normal bowel sounds, Soft; 


   Negative: Tenderness, Hepatospenomegaly


Extremity Exam:  Positive: Normal pulses; 


   Negative: Clubbing, Cyanosis, Edema


Skin Exam:  Positive: Nl turgor and temperature; 


   Negative: Rash, Breakdown


Neuro Exam:  Positive: Normal Gait, Normal Speech, Cranial Nerves 3-12 NL, 

Reflexes 2+


Psych Exam:  Positive: Mental status NL, Mood NL, Oriented x 3





Assessment /Plan


Assessment


85m p/w high grade sbo and newly diagnosed metastatic pancreatic cancer 





Pancreatic cancer


mets to mesentery


oncology consulting


prognosis poor


plan is for oncology follow up today


then if pt opts for hospice likely dc tomorrow








SBO


s/p surgical resection


post op care per surgeon


tolerating solid diet


moving bowels





Plan/VTE


VTE Prophylaxis Ordered?:  Yes (lovenox)





VS, I&O, 24H, Fishbone


Vital Signs/I&O





Vital Signs








  Date Time  Temp Pulse Resp B/P (MAP) Pulse Ox O2 Delivery O2 Flow Rate FiO2


 


8/5/19 14:00 97.7 70 18 136/65 (88) 92   


 


8/3/19 09:40       2.0 














I&O- Last 24 Hours up to 6 AM 


 


 8/5/19





 06:00


 


Intake Total 1100 ml


 


Output Total 2300 ml


 


Balance -1200 ml











Laboratory Data


24H LABS


Laboratory Tests 2


8/5/19 05:18: 


Immature Granulocyte % (Auto) 2.6, White Blood Count 13.8H, Red Blood Count 

3.68L, Hemoglobin 11.5L, Hematocrit 35.3L, Mean Corpuscular Volume 95.9, Mean 

Corpuscular Hemoglobin 31.3, Mean Corpuscular Hemoglobin Concent 32.6, Red Cell 

Distribution Width 12.8, Platelet Count 285, Neutrophils (%) (Auto) 73.8H, 

Lymphocytes (%) (Auto) 13.4L, Monocytes (%) (Auto) 7.8H, Eosinophils (%) (Auto) 

2.0, Basophils (%) (Auto) 0.4, Neutrophils # (Auto) 10.2H, Lymphocytes # (Auto) 

1.9, Monocytes # (Auto) 1.1H, Eosinophils # (Auto) 0.3, Basophils # (Auto) 0.1, 

Nucleated Red Blood Cells % (auto) 0.0, Anion Gap 4L, Glomerular Filtration Rate

> 60.0, Blood Urea Nitrogen 12, Creatinine 0.78, Sodium Level 140, Potassium 

Level 4.0, Chloride Level 106, Carbon Dioxide Level 30, Calcium Level 8.4L


CBC/BMP


Laboratory Tests


8/5/19 05:18








Red Blood Count 3.68 L, Mean Corpuscular Volume 95.9, Mean Corpuscular 

Hemoglobin 31.3, Mean Corpuscular Hemoglobin Concent 32.6, Red Cell Distribution

Width 12.8, Neutrophils (%) (Auto) 73.8 H, Lymphocytes (%) (Auto) 13.4 L, 

Monocytes (%) (Auto) 7.8 H, Eosinophils (%) (Auto) 2.0, Basophils (%) (Auto) 

0.4, Neutrophils # (Auto) 10.2 H, Lymphocytes # (Auto) 1.9, Monocytes # (Auto) 

1.1 H, Eosinophils # (Auto) 0.3, Basophils # (Auto) 0.1, Calcium Level 8.4 L











FREEDOM DOTSON MD           Aug 5, 2019 17:23

## 2019-08-05 NOTE — IPNPDOC
Text Note


Date of Service


The patient was seen on 8/5/19.





NOTE


No acute events over the weekend. Denies nausea, emesis, or fevers. He is tole

rating clq diet with the NGT out and has flatus and BMs. 





VSSAF





NAD





abd - soft, non distended, TTP appropriate, dressing c/d/i





labs - see below





A) 84y/o male with partial SBO secondary to metastatic pancreatic CA


POD#4 s/p sbr with primary anastamosis





P)


ambulate


reg diet


PT eval


d/c home today or tomorrow after he discussed plan with oncology. 





Buzz Davidson DO





VS,Fishbone, I+O


VS, Fishbone, I+O


Laboratory Tests


8/5/19 05:18








Red Blood Count 3.68 L, Mean Corpuscular Volume 95.9, Mean Corpuscular 

Hemoglobin 31.3, Mean Corpuscular Hemoglobin Concent 32.6, Red Cell Distribution

Width 12.8, Neutrophils (%) (Auto) 73.8 H, Lymphocytes (%) (Auto) 13.4 L, 

Monocytes (%) (Auto) 7.8 H, Eosinophils (%) (Auto) 2.0, Basophils (%) (Auto) 

0.4, Neutrophils # (Auto) 10.2 H, Lymphocytes # (Auto) 1.9, Monocytes # (Auto) 

1.1 H, Eosinophils # (Auto) 0.3, Basophils # (Auto) 0.1, Calcium Level 8.4 L








Vital Signs








  Date Time  Temp Pulse Resp B/P (MAP) Pulse Ox O2 Delivery O2 Flow Rate FiO2


 


8/5/19 05:33 97.3 72 20 143/74 (97) 95   


 


8/3/19 09:40       2.0 














I&O- Last 24 Hours up to 6 AM 


 


 8/5/19





 05:59


 


Intake Total 1100 ml


 


Output Total 2300 ml


 


Balance -1200 ml

















RAO DAVIDSON DO             Aug 5, 2019 12:05

## 2019-08-06 VITALS — DIASTOLIC BLOOD PRESSURE: 72 MMHG | SYSTOLIC BLOOD PRESSURE: 150 MMHG

## 2019-08-06 RX ADMIN — DOCUSATE SODIUM,SENNOSIDES SCH TAB: 50; 8.6 TABLET, FILM COATED ORAL at 08:02

## 2019-08-06 RX ADMIN — POTASSIUM CHLORIDE SCH MEQ: 750 TABLET, EXTENDED RELEASE ORAL at 08:01

## 2019-08-06 RX ADMIN — SODIUM CHLORIDE, PRESERVATIVE FREE SCH ML: 5 INJECTION INTRAVENOUS at 05:07

## 2019-08-06 NOTE — IPNPDOC
Text Note


Date of Service


The patient was seen on 8/6/19.





NOTE


No acute events overnight . Denies nausea, emesis, or fevers. He is tolerating 

reg diet and has flatus and BMs. 





VSSAF





NAD





abd - soft, non distended, TTP appropriate, dressing c/d/i





labs - see below





A) 84y/o male with partial SBO secondary to metastatic pancreatic CA


POD#5 s/p sbr with primary anastamosis





P)


ambulate


reg diet


d/c home today with home hospice consult


f/u with me 8/15 in the office for staple removal. 





Buzz Davidson DO





VS,Fishbone, I+O


VS, Fishbone, I+O





Vital Signs








  Date Time  Temp Pulse Resp B/P (MAP) Pulse Ox O2 Delivery O2 Flow Rate FiO2


 


8/6/19 06:03 98.3 73 14 150/72 (98) 94   


 


8/3/19 09:40       2.0 














I&O- Last 24 Hours up to 6 AM 


 


 8/6/19





 06:00


 


Intake Total 1410 ml


 


Output Total 0 ml


 


Balance 1410 ml

















RAO DAVIDSON DO             Aug 6, 2019 09:00

## 2019-08-06 NOTE — DS.PDOC
Discharge Summary


General


Date of Admission


Jul 29, 2019 at 09:31


Date of Discharge


8/6/19


Specialist/Consultants Involve:  Chinyere Daley MD


Specialist/Consultants Involve


Dr. Davidson





Discharge Summary


PROCEDURES PERFORMED DURING STAY: Ex-lap with laparotomy and small bowel 

resection, primary anastomosis





ADMITTING DIAGNOSES: 


1. abdominal pain





DISCHARGE DIAGNOSES:


1. small bowel obstruction


2. pancreatic cancer


3. hypertension


4. dyslipidemia





COMPLICATIONS/CHIEF COMPLAINT: Bowel Obstruction, Pancreatic Mass.





HISTORY OF PRESENT ILLNESS: Mr. Clark is an 85 year old male resented with 

complaints of diffuse, nonradiating, eating, 9 out of 10 in severity, abdominal 

pain. Associated symptoms included constipation nausea, vomiting and abdominal 

distention. He denied obstipation. He has lost weight but denies fevers and 

chills.








HOSPITAL COURSE: Patient was admitted for further evaluation and treatment, 

found to have a small bowel obstruction and pancreatic mass.  He was seen and 

evaluated by general surgery and oncology. He underwent surgical intervention 

including small bowel resection, with biopsy and fine needle aspiration of his 

pancreatic mass, which revealed metastatic adenocarcinoma. The patient and 

family elected to proceed with comfort measures only, and the patient was 

discharged home with hospice care.





DISCHARGE MEDICATIONS: Please see below.


 


ALLERGIES: Please see below.





PHYSICAL EXAMINATION ON DISCHARGE:


VITAL SIGNS: Please see below.


GENERAL: NAD, lying comfortably in bed, elderly


HEENT: NC/AT


CARDIOVASCULAR EXAMINATION: +S1S2, RRR


RESPIRATORY EXAMINATION: CTA B/L


ABDOMINAL EXAMINATION: soft





LABORATORY DATA: Please see below.





PROGNOSIS: poor prognosis, home hospice





ACTIVITY: [As tolerated].





DIET: as tolerated





DISPOSITION: [facility].





DISCHARGE INSTRUCTIONS:


1. further direction as per hospice and PCP.





TIME SPENT ON DISCHARGE: Greater than 35 minutes.





Vital Signs/I&Os





Vital Signs








  Date Time  Temp Pulse Resp B/P (MAP) Pulse Ox O2 Delivery O2 Flow Rate FiO2


 


8/6/19 06:03 98.3 73 14 150/72 (98) 94   


 


8/3/19 09:40       2.0 














I&O- Last 24 Hours up to 6 AM 


 


 8/6/19





 06:00


 


Intake Total 1410 ml


 


Output Total 0 ml


 


Balance 1410 ml











Discharge Medications


Scheduled


Allopurinol (Zyloprim) 300 Mg Tablet, 300 MG PO DAILY, (Reported)


Finasteride (Finasteride) 5 Mg Tablet, 5 MG PO QPM, (Reported)


Omeprazole Magnesium (Prilosec Otc) 20 Mg Tablet.dr, 20 MG PO DAILY, (Reported)


Silodosin (Silodosin) 8 Mg Capsule, 8 MG PO QPM, (Reported)





Scheduled PRN


Hyoscyamine Sulfate (Hyoscyamine Sulfate) 0.125 Mg Tab.subl, 0.125 MG PO Q4HP 

PRN for TERMINAL SECRETIONS


   Use sublingually if unable to swallow 


Lorazepam (Lorazepam) 0.5 Mg Tablet, 0.5 MG PO Q4HP PRN for ANXIETY/AGITATION


   Use sublingually if unable to swallow 


Morphine Sulfate (Morphine Sulfate) 100 Mg/5 Ml Solution, 0.25-1 ML PO Q2H PRN 

for PAIN OR DYSPNEA


   Use sublingually if unable to swallow 





Allergies


Coded Allergies:  


     No Known Allergies (Verified , 7/7/03)











PRASANTH VARGAS MD               Aug 6, 2019 15:14